# Patient Record
Sex: FEMALE | Race: WHITE | Employment: FULL TIME | URBAN - METROPOLITAN AREA
[De-identification: names, ages, dates, MRNs, and addresses within clinical notes are randomized per-mention and may not be internally consistent; named-entity substitution may affect disease eponyms.]

---

## 2017-01-24 ENCOUNTER — OFFICE VISIT (OUTPATIENT)
Dept: DERMATOLOGY | Age: 37
End: 2017-01-24

## 2017-01-24 DIAGNOSIS — L63.9 ALOPECIA AREATA: ICD-10-CM

## 2017-01-24 DIAGNOSIS — L63.9 ALOPECIA AREATA: Primary | ICD-10-CM

## 2017-01-24 LAB
T4 FREE: 1 NG/DL (ref 0.9–1.8)
TSH REFLEX FT4: 7.38 UIU/ML (ref 0.27–4.2)

## 2017-01-24 PROCEDURE — 11900 INJECT SKIN LESIONS </W 7: CPT | Performed by: DERMATOLOGY

## 2017-01-24 RX ORDER — SELENIUM SULFIDE 2.5 MG/100ML
LOTION TOPICAL DAILY PRN
COMMUNITY
End: 2018-02-20

## 2017-01-26 ENCOUNTER — TELEPHONE (OUTPATIENT)
Dept: DERMATOLOGY | Age: 37
End: 2017-01-26

## 2017-02-08 ENCOUNTER — TELEPHONE (OUTPATIENT)
Dept: DERMATOLOGY | Age: 37
End: 2017-02-08

## 2017-02-15 ENCOUNTER — OFFICE VISIT (OUTPATIENT)
Dept: DERMATOLOGY | Age: 37
End: 2017-02-15

## 2017-02-15 DIAGNOSIS — L63.9 ALOPECIA AREATA: Primary | ICD-10-CM

## 2017-02-15 PROCEDURE — 11900 INJECT SKIN LESIONS </W 7: CPT | Performed by: DERMATOLOGY

## 2017-04-05 ENCOUNTER — OFFICE VISIT (OUTPATIENT)
Dept: ENDOCRINOLOGY | Age: 37
End: 2017-04-05

## 2017-04-05 ENCOUNTER — HOSPITAL ENCOUNTER (OUTPATIENT)
Dept: GENERAL RADIOLOGY | Age: 37
Discharge: HOME | End: 2017-04-05
Attending: INTERNAL MEDICINE | Admitting: INTERNAL MEDICINE

## 2017-04-05 VITALS
DIASTOLIC BLOOD PRESSURE: 80 MMHG | OXYGEN SATURATION: 98 % | WEIGHT: 222 LBS | HEART RATE: 66 BPM | RESPIRATION RATE: 12 BRPM | HEIGHT: 66 IN | SYSTOLIC BLOOD PRESSURE: 117 MMHG | BODY MASS INDEX: 35.68 KG/M2 | TEMPERATURE: 97.1 F

## 2017-04-05 DIAGNOSIS — E03.8 SUBCLINICAL HYPOTHYROIDISM: Primary | ICD-10-CM

## 2017-04-05 DIAGNOSIS — E03.8 SUBCLINICAL HYPOTHYROIDISM: ICD-10-CM

## 2017-04-05 LAB
ANTI-THYROGLOB ABS: 25 IU/ML
T4 FREE: 1.1 NG/DL (ref 0.9–1.8)
THYROID PEROXIDASE (TPO) ABS: 17 IU/ML
TSH SERPL DL<=0.05 MIU/L-ACNC: 6.9 UIU/ML (ref 0.27–4.2)

## 2017-04-05 PROCEDURE — 99243 OFF/OP CNSLTJ NEW/EST LOW 30: CPT | Performed by: INTERNAL MEDICINE

## 2017-04-06 ENCOUNTER — TELEPHONE (OUTPATIENT)
Dept: ENDOCRINOLOGY | Age: 37
End: 2017-04-06

## 2017-04-06 RX ORDER — LEVOTHYROXINE SODIUM 0.03 MG/1
25 TABLET ORAL DAILY
Qty: 30 TABLET | Refills: 3 | Status: SHIPPED | OUTPATIENT
Start: 2017-04-06 | End: 2018-02-20

## 2017-04-19 ENCOUNTER — OFFICE VISIT (OUTPATIENT)
Dept: DERMATOLOGY | Age: 37
End: 2017-04-19

## 2017-04-19 DIAGNOSIS — L63.9 ALOPECIA AREATA: Primary | ICD-10-CM

## 2017-04-19 PROCEDURE — 11900 INJECT SKIN LESIONS </W 7: CPT | Performed by: DERMATOLOGY

## 2017-05-02 ENCOUNTER — OFFICE VISIT (OUTPATIENT)
Dept: FAMILY MEDICINE CLINIC | Age: 37
End: 2017-05-02

## 2017-05-02 VITALS
RESPIRATION RATE: 12 BRPM | HEART RATE: 71 BPM | SYSTOLIC BLOOD PRESSURE: 96 MMHG | TEMPERATURE: 98 F | DIASTOLIC BLOOD PRESSURE: 69 MMHG | WEIGHT: 224.4 LBS | OXYGEN SATURATION: 98 % | BODY MASS INDEX: 37.39 KG/M2 | HEIGHT: 65 IN

## 2017-05-02 DIAGNOSIS — Z00.00 ROUTINE GENERAL MEDICAL EXAMINATION AT A HEALTH CARE FACILITY: Primary | ICD-10-CM

## 2017-05-02 DIAGNOSIS — E66.9 OBESITY (BMI 35.0-39.9 WITHOUT COMORBIDITY): ICD-10-CM

## 2017-05-02 DIAGNOSIS — Z13.220 LIPID SCREENING: ICD-10-CM

## 2017-05-02 DIAGNOSIS — Z01.419 WOMEN'S ANNUAL ROUTINE GYNECOLOGICAL EXAMINATION: ICD-10-CM

## 2017-05-02 PROCEDURE — 99395 PREV VISIT EST AGE 18-39: CPT | Performed by: FAMILY MEDICINE

## 2017-05-02 RX ORDER — COPPER 313.4 MG/1
1 INTRAUTERINE DEVICE INTRAUTERINE ONCE
COMMUNITY
End: 2018-02-20

## 2017-05-15 ENCOUNTER — TELEPHONE (OUTPATIENT)
Dept: FAMILY MEDICINE CLINIC | Age: 37
End: 2017-05-15

## 2017-06-21 ENCOUNTER — OFFICE VISIT (OUTPATIENT)
Dept: DERMATOLOGY | Age: 37
End: 2017-06-21

## 2017-06-21 DIAGNOSIS — L73.9 FOLLICULITIS: ICD-10-CM
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
## 2017-06-21 DIAGNOSIS — S30.861A ARTHROPOD BITE OF ABDOMINAL WALL, INITIAL ENCOUNTER: ICD-10-CM

## 2017-06-21 DIAGNOSIS — L63.9 ALOPECIA AREATA: Primary | ICD-10-CM

## 2017-06-21 PROCEDURE — 11900 INJECT SKIN LESIONS </W 7: CPT | Performed by: DERMATOLOGY

## 2017-06-21 PROCEDURE — 99214 OFFICE O/P EST MOD 30 MIN: CPT | Performed by: DERMATOLOGY

## 2017-06-21 RX ORDER — FLUOCINONIDE 0.5 MG/G
OINTMENT TOPICAL
Qty: 30 G | Refills: 0 | Status: SHIPPED | OUTPATIENT
Start: 2017-06-21 | End: 2018-02-20

## 2017-06-21 RX ORDER — CLINDAMYCIN PHOSPHATE 11.9 MG/ML
SOLUTION TOPICAL
Qty: 60 ML | Refills: 3 | Status: SHIPPED | OUTPATIENT
Start: 2017-06-21 | End: 2018-02-20

## 2017-09-27 ENCOUNTER — OFFICE VISIT (OUTPATIENT)
Dept: DERMATOLOGY | Age: 37
End: 2017-09-27

## 2017-09-27 DIAGNOSIS — L63.9 ALOPECIA AREATA: Primary | ICD-10-CM

## 2017-09-27 PROCEDURE — 11900 INJECT SKIN LESIONS </W 7: CPT | Performed by: DERMATOLOGY

## 2017-10-13 ENCOUNTER — OFFICE VISIT (OUTPATIENT)
Dept: GYNECOLOGY | Age: 37
End: 2017-10-13

## 2017-10-13 VITALS
DIASTOLIC BLOOD PRESSURE: 75 MMHG | TEMPERATURE: 97.2 F | BODY MASS INDEX: 36.29 KG/M2 | RESPIRATION RATE: 17 BRPM | WEIGHT: 225.8 LBS | HEIGHT: 66 IN | SYSTOLIC BLOOD PRESSURE: 111 MMHG | HEART RATE: 68 BPM

## 2017-10-13 DIAGNOSIS — Z01.419 WELL WOMAN EXAM WITH ROUTINE GYNECOLOGICAL EXAM: Primary | ICD-10-CM

## 2017-10-13 DIAGNOSIS — B35.4 TINEA CORPORIS: ICD-10-CM

## 2017-10-13 PROCEDURE — 99385 PREV VISIT NEW AGE 18-39: CPT | Performed by: OBSTETRICS & GYNECOLOGY

## 2017-10-13 RX ORDER — FLUCONAZOLE 100 MG/1
100 TABLET ORAL DAILY
Qty: 7 TABLET | Refills: 1 | Status: SHIPPED | OUTPATIENT
Start: 2017-10-13 | End: 2017-10-20

## 2017-10-17 LAB
HPV COMMENT: NORMAL
HPV TYPE 16: NOT DETECTED
HPV TYPE 18: NOT DETECTED
HPVOH (OTHER TYPES): NOT DETECTED

## 2017-10-17 ASSESSMENT — ENCOUNTER SYMPTOMS
RESPIRATORY NEGATIVE: 1
EYES NEGATIVE: 1
GASTROINTESTINAL NEGATIVE: 1

## 2017-10-18 NOTE — PROGRESS NOTES
for the 10/13/17 encounter (Office Visit) with Tamera Regalado MD   Medication Sig Dispense Refill    fluconazole (DIFLUCAN) 100 MG tablet Take 1 tablet by mouth daily for 7 days 7 tablet 1    clindamycin (CLEOCIN-T) 1 % external solution Apply to new lesions on scalp bid. 60 mL 3    fluocinonide (LIDEX) 0.05 % ointment Apply sparingly to affected area(s) bid prn for flares, up to 2 weeks at a time. Do not apply on cleared skin. 30 g 0    PARAGARD INTRAUTERINE COPPER IUD 1 each by Intrauterine route once 4/2010      selenium sulfide (SELSUN) 2.5 % lotion Apply topically daily as needed for Itching Apply topically daily as needed. Family History   Problem Relation Age of Onset    Lung Cancer Paternal Grandmother     Cancer Paternal Grandmother      melanoma, lung    Heart Disease Maternal Grandfather     Heart Disease Maternal Grandmother     Diabetes Maternal Grandmother     Cancer Maternal Grandmother      skin, NMSC    Heart Disease Paternal Grandfather     Hypertension Mother     Cancer Mother      skin,NMSC    Hypertension Father     Breast Cancer Paternal Aunt     Breast Cancer Maternal Aunt      /75 (Site: Right Arm, Position: Sitting, Cuff Size: Large Adult)   Pulse 68   Temp 97.2 °F (36.2 °C) (Oral)   Resp 17   Ht 5' 6\" (1.676 m)   Wt 225 lb 12.8 oz (102.4 kg)   LMP 10/07/2017 (Exact Date)   Breastfeeding? No   BMI 36.45 kg/m²       Objective:   Physical Exam   Constitutional: She is oriented to person, place, and time. She appears well-developed and well-nourished. No distress. HENT:   Head: Normocephalic and atraumatic. Eyes: EOM are normal. Pupils are equal, round, and reactive to light. Neck: Normal range of motion. Neck supple. No thyromegaly present. Cardiovascular: Normal rate, regular rhythm and normal heart sounds. Exam reveals no gallop and no friction rub. No murmur heard.   Pulmonary/Chest: Effort normal and breath sounds normal. No respiratory distress. She has no wheezes. She has no rales. Abdominal: Soft. She exhibits no distension and no mass. There is no hepatomegaly. There is no tenderness. There is no rebound and no guarding. No hernia. Genitourinary: Vagina normal and uterus normal. Rectal exam shows no external hemorrhoid and no fissure. No breast swelling, tenderness, discharge or bleeding. Pelvic exam was performed with patient supine. No labial fusion. There is no rash, tenderness, lesion or injury on the right labia. There is no rash, tenderness, lesion or injury on the left labia. Uterus is not deviated, not enlarged, not fixed and not tender. Cervix exhibits no motion tenderness, no discharge and no friability. Right adnexum displays no mass, no tenderness and no fullness. Left adnexum displays no mass, no tenderness and no fullness. No erythema, tenderness or bleeding in the vagina. No foreign body in the vagina. No signs of injury around the vagina. No vaginal discharge found. Genitourinary Comments: Normal urethral meatus, nl urethra, nl bladder. Musculoskeletal: Normal range of motion. Lymphadenopathy:        Right: No inguinal adenopathy present. Left: No inguinal adenopathy present. Neurological: She is alert and oriented to person, place, and time. She has normal reflexes. Skin: Skin is warm and dry. Rash noted. Rash along abdomen   Psychiatric: She has a normal mood and affect. Her behavior is normal. Judgment and thought content normal.       Assessment:      1. Annual  2. Tinea corporis      Plan:      1. Pap, calcium, exercise  2.  Try diflucan and see if helps-can repeat if needed

## 2018-03-01 ENCOUNTER — OFFICE VISIT (OUTPATIENT)
Dept: ORTHOPEDIC SURGERY | Age: 38
End: 2018-03-01

## 2018-03-01 ENCOUNTER — OFFICE VISIT (OUTPATIENT)
Dept: DERMATOLOGY | Age: 38
End: 2018-03-01

## 2018-03-01 VITALS — BODY MASS INDEX: 28.91 KG/M2 | WEIGHT: 179.9 LBS | HEIGHT: 66 IN

## 2018-03-01 DIAGNOSIS — L63.9 ALOPECIA AREATA: Primary | ICD-10-CM

## 2018-03-01 DIAGNOSIS — M77.42 METATARSALGIA OF LEFT FOOT: Primary | ICD-10-CM

## 2018-03-01 DIAGNOSIS — M21.42 PES PLANUS - ACQUIRED, LEFT: ICD-10-CM

## 2018-03-01 PROCEDURE — L3040 FT ARCH SUPRT PREMOLD LONGIT: HCPCS | Performed by: ORTHOPAEDIC SURGERY

## 2018-03-01 PROCEDURE — G8427 DOCREV CUR MEDS BY ELIG CLIN: HCPCS | Performed by: ORTHOPAEDIC SURGERY

## 2018-03-01 PROCEDURE — 11900 INJECT SKIN LESIONS </W 7: CPT | Performed by: DERMATOLOGY

## 2018-03-01 PROCEDURE — G8484 FLU IMMUNIZE NO ADMIN: HCPCS | Performed by: ORTHOPAEDIC SURGERY

## 2018-03-01 PROCEDURE — 99203 OFFICE O/P NEW LOW 30 MIN: CPT | Performed by: ORTHOPAEDIC SURGERY

## 2018-03-01 PROCEDURE — 1036F TOBACCO NON-USER: CPT | Performed by: ORTHOPAEDIC SURGERY

## 2018-03-01 PROCEDURE — G8417 CALC BMI ABV UP PARAM F/U: HCPCS | Performed by: ORTHOPAEDIC SURGERY

## 2018-03-01 RX ORDER — MELOXICAM 15 MG/1
15 TABLET ORAL DAILY
Qty: 30 TABLET | Refills: 3 | Status: SHIPPED | OUTPATIENT
Start: 2018-03-01 | End: 2018-04-18

## 2018-03-01 RX ORDER — METHYLPREDNISOLONE 4 MG/1
TABLET ORAL
Qty: 1 KIT | Refills: 0 | Status: SHIPPED | OUTPATIENT
Start: 2018-03-01 | End: 2018-04-18 | Stop reason: ALTCHOICE

## 2018-03-01 NOTE — PROGRESS NOTES
extremity does not show any tenderness, deformity or injury. Range of motion is unremarkable. There is no gross instability. There are no rashes, ulcerations or lesions. Strength and tone are normal.    Radiology:     X-rays reviewed in office: 3 views from the emergency room were obtained and reviewed, there is no evidence of fracture or dislocation. Assessment :  Left foot pes planus and metatarsalgia    Impression:  Encounter Diagnoses   Name Primary?  Metatarsalgia of left foot Yes    Pes planus - acquired, left        Office Procedures:  Orders Placed This Encounter   Procedures    [facility] - [facility]     Referral Priority:   Routine     Referral Type:   Eval and Treat     Referral Reason:   Specialty Services Required     Requested Specialty:   Physical Therapy     Number of Visits Requested:   1    Powerstep Protech Full Length Insert     Patient was prescribed Powerstep Protech Full Length Inserts. The bilateral feet will require stabilization / support from this semi-rigid / rigid orthosis to improve their function. The orthosis will assist in protecting the affected area, provide functional support and facilitate healing. The patient was educated and fit by a healthcare professional with expert knowledge and specialization in brace application while under the direct supervision of the treating physician. Verbal and written instructions for the use of and application of this item were provided. They were instructed to contact the office immediately should the brace result in increased pain, decreased sensation, increased swelling or worsening of the condition. Treatment Plan:  We discussed the etiology of Pes planus and metatarsalgia as well as its operative and nonoperative treatments.   Nonoperative treatment includes activity modification, immobilization with cast or boot, support like shoes and inserts, medicines by mouth as appropriate, physical therapy,

## 2018-03-01 NOTE — PROGRESS NOTES
[facility]) Dermatology  Cain Jackashliemaikel      [place] Vanna  1980    40 y.o. female     Date of Visit: 3/1/2018    Last Visit: 5mo    Chief Complaint: AA    History of Present Illness:  1. Follow-up for AA of scalp. S/p ILK 5mg/ml x 5. Reports continued hair growth in all but occipital scalp lesions. Last month, noticed a new lesion on L side. -12/2016 - CBC wnl  -TSH 5.13, FT4 wnl -- started on synthroid    Derm History:   -TV - selenium sulfide lotion   -Scalp folliculitis - clindamycin soln  -Arthropod bites - lidex oint     Review of Systems:  Constitutional: Reports general sense of well-being. Allergies: Reviewed and updated. Past Medical History, Surgical History, Medications and Allergies reviewed. Past Medical History:   Diagnosis Date    Obesity (BMI 35.0-39.9 without comorbidity) 5/2/2017    Subclinical hypothyroidism 4/5/2017     History reviewed. No pertinent surgical history. Allergies   Allergen Reactions    Honey Bee Venom [Bee Venom] Anaphylaxis    Nutritional Supplements Shortness Of Breath    Benadryl [Diphenhydramine Hcl] Hives    Demerol Hives    Paula Nausea Only    Amoxicillin Nausea And Vomiting    Codeine Nausea And Vomiting    Erythromycin Nausea And Vomiting and Rash    Morphine Nausea And Vomiting    Pcn [Penicillins] Nausea And Vomiting    Percocet [Oxycodone-Acetaminophen] Nausea And Vomiting     No outpatient prescriptions have been marked as taking for the 3/1/18 encounter (Office Visit) with Yamile Shrestha MD.       Physical Examination     The following were examined and determined to be normal: Psych/Neuro. The following were examined and determined to be abnormal: Scalp/hair    -General: Well-appearing, NAD  1. L parietal scalp 3cm, midline occipital scalp 6cm (increased from 4cm at last visit) - mildly alopecic skin-colored patches w/ several short intermediate hairs    Assessment and Plan     1.  Alopecia areata - marked improvement but continued lesions   -IL kenalog 5 mg/ml; total 0.8ml to 2 lesion(s).  Edu re: atrophy, dyspigmentation.   -Repeat in 1 month  -Edu re: natural course of AA, potential need for repeat ILK at next several visits

## 2018-04-11 ENCOUNTER — OFFICE VISIT (OUTPATIENT)
Dept: DERMATOLOGY | Age: 38
End: 2018-04-11

## 2018-04-11 DIAGNOSIS — L63.9 ALOPECIA AREATA: Primary | ICD-10-CM

## 2018-04-11 PROCEDURE — 11900 INJECT SKIN LESIONS </W 7: CPT | Performed by: DERMATOLOGY

## 2018-04-11 RX ORDER — LEVOTHYROXINE SODIUM 0.03 MG/1
25 TABLET ORAL DAILY
COMMUNITY
End: 2018-05-10

## 2018-04-18 ENCOUNTER — OFFICE VISIT (OUTPATIENT)
Dept: GYNECOLOGY | Age: 38
End: 2018-04-18

## 2018-04-18 VITALS
SYSTOLIC BLOOD PRESSURE: 118 MMHG | RESPIRATION RATE: 17 BRPM | HEIGHT: 66 IN | DIASTOLIC BLOOD PRESSURE: 72 MMHG | BODY MASS INDEX: 36.48 KG/M2 | WEIGHT: 227 LBS | HEART RATE: 80 BPM

## 2018-04-18 DIAGNOSIS — B37.2 YEAST INFECTION OF THE SKIN: ICD-10-CM

## 2018-04-18 DIAGNOSIS — E03.8 OTHER SPECIFIED HYPOTHYROIDISM: Primary | ICD-10-CM

## 2018-04-18 LAB
ALBUMIN SERPL-MCNC: 4.5 G/DL (ref 3.4–5)
ALP BLD-CCNC: 72 U/L (ref 40–129)
ALT SERPL-CCNC: 15 U/L (ref 10–40)
ANION GAP SERPL CALCULATED.3IONS-SCNC: 14 MMOL/L (ref 3–16)
AST SERPL-CCNC: 16 U/L (ref 15–37)
BILIRUB SERPL-MCNC: 0.3 MG/DL (ref 0–1)
BILIRUBIN DIRECT: <0.2 MG/DL (ref 0–0.3)
BILIRUBIN, INDIRECT: NORMAL MG/DL (ref 0–1)
BUN BLDV-MCNC: 16 MG/DL (ref 7–20)
CALCIUM SERPL-MCNC: 9.5 MG/DL (ref 8.3–10.6)
CHLORIDE BLD-SCNC: 100 MMOL/L (ref 99–110)
CO2: 26 MMOL/L (ref 21–32)
CREAT SERPL-MCNC: 0.7 MG/DL (ref 0.6–1.1)
GFR AFRICAN AMERICAN: >60
GFR NON-AFRICAN AMERICAN: >60
GLUCOSE BLD-MCNC: 99 MG/DL (ref 70–99)
PHOSPHORUS: 4.3 MG/DL (ref 2.5–4.9)
POTASSIUM SERPL-SCNC: 4.6 MMOL/L (ref 3.5–5.1)
SODIUM BLD-SCNC: 140 MMOL/L (ref 136–145)
T3 FREE: 3.4 PG/ML (ref 2.3–4.2)
T4 FREE: 1.1 NG/DL (ref 0.9–1.8)
TOTAL PROTEIN: 6.9 G/DL (ref 6.4–8.2)
TSH SERPL DL<=0.05 MIU/L-ACNC: 4.77 UIU/ML (ref 0.27–4.2)

## 2018-04-18 PROCEDURE — 1036F TOBACCO NON-USER: CPT | Performed by: OBSTETRICS & GYNECOLOGY

## 2018-04-18 PROCEDURE — G8427 DOCREV CUR MEDS BY ELIG CLIN: HCPCS | Performed by: OBSTETRICS & GYNECOLOGY

## 2018-04-18 PROCEDURE — G8417 CALC BMI ABV UP PARAM F/U: HCPCS | Performed by: OBSTETRICS & GYNECOLOGY

## 2018-04-18 PROCEDURE — 99213 OFFICE O/P EST LOW 20 MIN: CPT | Performed by: OBSTETRICS & GYNECOLOGY

## 2018-04-18 RX ORDER — FLUCONAZOLE 100 MG/1
100 TABLET ORAL DAILY
Qty: 21 TABLET | Refills: 0 | Status: SHIPPED | OUTPATIENT
Start: 2018-04-18 | End: 2021-04-07 | Stop reason: SDUPTHER

## 2018-05-10 ENCOUNTER — OFFICE VISIT (OUTPATIENT)
Dept: ENDOCRINOLOGY | Age: 38
End: 2018-05-10

## 2018-05-10 VITALS
BODY MASS INDEX: 36.71 KG/M2 | HEART RATE: 71 BPM | SYSTOLIC BLOOD PRESSURE: 113 MMHG | OXYGEN SATURATION: 99 % | HEIGHT: 66 IN | WEIGHT: 228.4 LBS | RESPIRATION RATE: 14 BRPM | DIASTOLIC BLOOD PRESSURE: 81 MMHG

## 2018-05-10 DIAGNOSIS — E03.8 SUBCLINICAL HYPOTHYROIDISM: Primary | ICD-10-CM

## 2018-05-10 PROCEDURE — G8427 DOCREV CUR MEDS BY ELIG CLIN: HCPCS | Performed by: INTERNAL MEDICINE

## 2018-05-10 PROCEDURE — G8417 CALC BMI ABV UP PARAM F/U: HCPCS | Performed by: INTERNAL MEDICINE

## 2018-05-10 PROCEDURE — 99214 OFFICE O/P EST MOD 30 MIN: CPT | Performed by: INTERNAL MEDICINE

## 2018-05-10 PROCEDURE — 1036F TOBACCO NON-USER: CPT | Performed by: INTERNAL MEDICINE

## 2018-05-10 RX ORDER — LEVOTHYROXINE SODIUM 25 MCG
25 TABLET ORAL DAILY
Qty: 30 TABLET | Refills: 3 | Status: SHIPPED | OUTPATIENT
Start: 2018-05-10 | End: 2021-04-07 | Stop reason: ALTCHOICE

## 2018-05-10 ASSESSMENT — ENCOUNTER SYMPTOMS
BLURRED VISION: 0
COUGH: 0
HEMOPTYSIS: 0
DOUBLE VISION: 0
BACK PAIN: 0
PHOTOPHOBIA: 0
ORTHOPNEA: 0

## 2018-05-23 ENCOUNTER — OFFICE VISIT (OUTPATIENT)
Dept: DERMATOLOGY | Age: 38
End: 2018-05-23

## 2018-05-23 DIAGNOSIS — L63.9 ALOPECIA AREATA: Primary | ICD-10-CM

## 2018-05-23 PROCEDURE — 11900 INJECT SKIN LESIONS </W 7: CPT | Performed by: DERMATOLOGY

## 2018-08-05 PROBLEM — M21.40 ACQUIRED PES PLANUS: Status: ACTIVE | Noted: 2018-03-01

## 2018-10-23 ENCOUNTER — OFFICE VISIT (OUTPATIENT)
Dept: DERMATOLOGY | Age: 38
End: 2018-10-23
Payer: MEDICARE

## 2018-10-23 DIAGNOSIS — L63.9 ALOPECIA AREATA: Primary | ICD-10-CM

## 2018-10-23 DIAGNOSIS — B00.9 HERPES SIMPLEX: ICD-10-CM

## 2018-10-23 PROCEDURE — 99214 OFFICE O/P EST MOD 30 MIN: CPT | Performed by: DERMATOLOGY

## 2018-10-23 PROCEDURE — 11900 INJECT SKIN LESIONS </W 7: CPT | Performed by: DERMATOLOGY

## 2018-10-23 RX ORDER — ACYCLOVIR 50 MG/G
OINTMENT TOPICAL
Qty: 30 G | Refills: 1 | Status: SHIPPED | OUTPATIENT
Start: 2018-10-23 | End: 2021-04-07 | Stop reason: SDUPTHER

## 2018-10-23 NOTE — PROGRESS NOTES
[facility]) Dermatology  Sage Saravia      Layton Powersven  1980    41 y.o. female     Date of Visit: 10/23/2018    Last Visit: 5mo    Chief Complaint: AA    History of Present Illness:  1. Follow-up for AA of scalp. S/p ILK 5mg/ml. Reports continued hair growth in 3 treated lesions. No new lesions. -12/2016 - CBC wnl  -TSH 5.13, FT4 wnl -- started on synthroid    2. New issue. Complains of a mildly tender recurrent blistering eruption of R chin. Flares every few months, typically around menses. Recurs in the same location. Derm History:   -TV - selenium sulfide lotion   -Scalp folliculitis - clindamycin soln  -Arthropod bites - lidex oint     Review of Systems:  Constitutional: Reports general sense of well-being. Allergies: Reviewed and updated. Past Medical History, Surgical History, Medications and Allergies reviewed. Past Medical History:   Diagnosis Date    Obesity (BMI 35.0-39.9 without comorbidity) 5/2/2017    Subclinical hypothyroidism 4/5/2017     History reviewed. No pertinent surgical history. Allergies   Allergen Reactions    Honey Bee Venom [Bee Venom] Anaphylaxis    Nutritional Supplements Shortness Of Breath    Benadryl [Diphenhydramine Hcl] Hives    Demerol Hives    Paula Nausea Only    Amoxicillin Nausea And Vomiting    Codeine Nausea And Vomiting    Erythromycin Nausea And Vomiting and Rash    Morphine Nausea And Vomiting    Pcn [Penicillins] Nausea And Vomiting    Percocet [Oxycodone-Acetaminophen] Nausea And Vomiting     Outpatient Prescriptions Marked as Taking for the 10/23/18 encounter (Office Visit) with Shakira Tay MD   Medication Sig Dispense Refill    SYNTHROID 25 MCG tablet Take 1 tablet by mouth Daily 30 tablet 3       Physical Examination     The following were examined and determined to be normal: Psych/Neuro. The following were examined and determined to be abnormal: Scalp/hair, Head/face    -General: Well-appearing, NAD  1.  L

## 2021-04-07 ENCOUNTER — OFFICE VISIT (OUTPATIENT)
Dept: FAMILY MEDICINE CLINIC | Age: 41
End: 2021-04-07
Payer: COMMERCIAL

## 2021-04-07 VITALS
TEMPERATURE: 97.3 F | HEIGHT: 66 IN | HEART RATE: 85 BPM | OXYGEN SATURATION: 98 % | BODY MASS INDEX: 41.24 KG/M2 | SYSTOLIC BLOOD PRESSURE: 127 MMHG | RESPIRATION RATE: 16 BRPM | DIASTOLIC BLOOD PRESSURE: 82 MMHG | WEIGHT: 256.6 LBS

## 2021-04-07 DIAGNOSIS — R06.02 SHORTNESS OF BREATH: ICD-10-CM

## 2021-04-07 DIAGNOSIS — B37.2 YEAST INFECTION OF THE SKIN: ICD-10-CM

## 2021-04-07 DIAGNOSIS — Z00.00 ROUTINE GENERAL MEDICAL EXAMINATION AT A HEALTH CARE FACILITY: ICD-10-CM

## 2021-04-07 DIAGNOSIS — Z00.00 ROUTINE GENERAL MEDICAL EXAMINATION AT A HEALTH CARE FACILITY: Primary | ICD-10-CM

## 2021-04-07 DIAGNOSIS — Z91.030 BEE STING ALLERGY: ICD-10-CM

## 2021-04-07 LAB
A/G RATIO: 1.8 (ref 1.1–2.2)
ALBUMIN SERPL-MCNC: 4.2 G/DL (ref 3.4–5)
ALP BLD-CCNC: 64 U/L (ref 40–129)
ALT SERPL-CCNC: 14 U/L (ref 10–40)
ANION GAP SERPL CALCULATED.3IONS-SCNC: 10 MMOL/L (ref 3–16)
AST SERPL-CCNC: 16 U/L (ref 15–37)
BILIRUB SERPL-MCNC: <0.2 MG/DL (ref 0–1)
BUN BLDV-MCNC: 14 MG/DL (ref 7–20)
CALCIUM SERPL-MCNC: 8.9 MG/DL (ref 8.3–10.6)
CHLORIDE BLD-SCNC: 105 MMOL/L (ref 99–110)
CHOLESTEROL, TOTAL: 176 MG/DL (ref 0–199)
CO2: 24 MMOL/L (ref 21–32)
CREAT SERPL-MCNC: 0.7 MG/DL (ref 0.6–1.1)
D DIMER: <200 NG/ML DDU (ref 0–229)
GFR AFRICAN AMERICAN: >60
GFR NON-AFRICAN AMERICAN: >60
GLOBULIN: 2.3 G/DL
GLUCOSE BLD-MCNC: 87 MG/DL (ref 70–99)
HCT VFR BLD CALC: 40.1 % (ref 36–48)
HDLC SERPL-MCNC: 55 MG/DL (ref 40–60)
HEMOGLOBIN: 13.3 G/DL (ref 12–16)
LDL CHOLESTEROL CALCULATED: 74 MG/DL
MCH RBC QN AUTO: 28.6 PG (ref 26–34)
MCHC RBC AUTO-ENTMCNC: 33.3 G/DL (ref 31–36)
MCV RBC AUTO: 86.1 FL (ref 80–100)
PDW BLD-RTO: 15.3 % (ref 12.4–15.4)
PLATELET # BLD: 245 K/UL (ref 135–450)
PMV BLD AUTO: 8.3 FL (ref 5–10.5)
POTASSIUM SERPL-SCNC: 4.4 MMOL/L (ref 3.5–5.1)
RBC # BLD: 4.65 M/UL (ref 4–5.2)
SODIUM BLD-SCNC: 139 MMOL/L (ref 136–145)
T4 FREE: 1 NG/DL (ref 0.9–1.8)
TOTAL PROTEIN: 6.5 G/DL (ref 6.4–8.2)
TRIGL SERPL-MCNC: 237 MG/DL (ref 0–150)
TSH SERPL DL<=0.05 MIU/L-ACNC: 5.35 UIU/ML (ref 0.27–4.2)
VLDLC SERPL CALC-MCNC: 47 MG/DL
WBC # BLD: 7.8 K/UL (ref 4–11)

## 2021-04-07 PROCEDURE — 99385 PREV VISIT NEW AGE 18-39: CPT | Performed by: FAMILY MEDICINE

## 2021-04-07 RX ORDER — ACYCLOVIR 50 MG/G
OINTMENT TOPICAL
Qty: 30 G | Refills: 1 | Status: SHIPPED | OUTPATIENT
Start: 2021-04-07

## 2021-04-07 RX ORDER — EPINEPHRINE 0.3 MG/.3ML
INJECTION SUBCUTANEOUS
Qty: 2 EACH | Refills: 1 | Status: SHIPPED | OUTPATIENT
Start: 2021-04-07 | End: 2022-08-16

## 2021-04-07 RX ORDER — FLUCONAZOLE 100 MG/1
100 TABLET ORAL DAILY
Qty: 21 TABLET | Refills: 0 | Status: SHIPPED | OUTPATIENT
Start: 2021-04-07 | End: 2021-04-14

## 2021-04-07 SDOH — ECONOMIC STABILITY: INCOME INSECURITY: HOW HARD IS IT FOR YOU TO PAY FOR THE VERY BASICS LIKE FOOD, HOUSING, MEDICAL CARE, AND HEATING?: NOT HARD AT ALL

## 2021-04-07 SDOH — ECONOMIC STABILITY: TRANSPORTATION INSECURITY
IN THE PAST 12 MONTHS, HAS LACK OF TRANSPORTATION KEPT YOU FROM MEETINGS, WORK, OR FROM GETTING THINGS NEEDED FOR DAILY LIVING?: NO

## 2021-04-07 SDOH — ECONOMIC STABILITY: FOOD INSECURITY: WITHIN THE PAST 12 MONTHS, THE FOOD YOU BOUGHT JUST DIDN'T LAST AND YOU DIDN'T HAVE MONEY TO GET MORE.: NEVER TRUE

## 2021-04-07 SDOH — ECONOMIC STABILITY: FOOD INSECURITY: WITHIN THE PAST 12 MONTHS, YOU WORRIED THAT YOUR FOOD WOULD RUN OUT BEFORE YOU GOT MONEY TO BUY MORE.: NEVER TRUE

## 2021-04-07 ASSESSMENT — PATIENT HEALTH QUESTIONNAIRE - PHQ9: SUM OF ALL RESPONSES TO PHQ QUESTIONS 1-9: 0

## 2021-04-07 NOTE — PATIENT INSTRUCTIONS
Plan to complete your lab work. You can get it done at any [facility] locations such as the [facility] [address] [place] [address]. [facility]. There are certain medications for weight loss that your primary care doctor prescribes. Take your time to review those medications below. Call or send a [employer] message if you are ready to start 1 of those medications. Keep your follow-up with your OB/GYN. Go to the pharmacy to  medications. Your primary care doctor is asking that they dispense the affordable inexpensive epinephrine pen. Make sure you are familiar how to use the pen along with any family or friends that you typically enjoy the outdoors with. There are FDA approved medications and non-FDA approved medications Dr. Edith Cruz prescribes for weight loss. There is a medication called Saxenda. It is a once a day injection to the abdomen (small needle, it might be or might not be expensive). The other FDA approved med I prescribes is called Contrave which is a twice a day pill. Other medications that can help with weight loss (but not FDA approved for weight loss) include twice a day metformin (inexpensive, not as much weight loss as the FDA approved medications), or twice a day topiramate (inexpenstive, not as much weight loss as the FDA approved medications).

## 2021-04-07 NOTE — PROGRESS NOTES
[facility]  Progress Note  DO Trixie Esparza  1980 04/07/21    Chief Complaint:   Trixie Welch is a 45 y.o. female who is here for yearly checkup and prescription refill for cold sore        HPI:   Doing fine otherwise. Last visit here was in 2017. With some life changes and loss of health insurance patient was not able to follow-up either here or with her OB/GYN. She is due to get the IUD replaced by her [address]. She otherwise is doing fine and requests refill of antiviral which helps with cold sores and fluconazole tablets. [facility] she would like to [facility]ate the prescriptions. Due for lab work. Had thyroid levels that were borderline at 1 point and on for 25 mcg of levothyroxine. She is busy managing a retail store and also raising 4 children with her  who is a  at a country club. She does inform me of her health goals to lose weight. Finds that when she is leaning forward sometimes she experiences right-sided mid back pain that is situationally related to position and not with exertion. ROS negative for headache, visionchanges, chest pain,  abdominal pain, urinary sx, bowel changes. Past medical, surgical, and social history reviewed. and allergies reviewed. Allergies   Allergen Reactions    Honey Bee Venom [Bee Venom] Anaphylaxis    Nutritional Supplements Shortness Of Breath    Benadryl [Diphenhydramine Hcl] Hives    Demerol Hives    Paula Nausea Only    Amoxicillin Nausea And Vomiting    Codeine Nausea And Vomiting    Erythromycin Nausea And Vomiting and Rash    Morphine Nausea And Vomiting    Pcn [Penicillins] Nausea And Vomiting    Percocet [Oxycodone-Acetaminophen] Nausea And Vomiting     Prior to Visit Medications    Medication Sig Taking?  Authorizing Provider   fluconazole (DIFLUCAN) 100 MG tablet Take 1 tablet by mouth daily for 7 days Take from 4/18-4/24, 5/18-5/24, 6/18-6/24 Yes Kwan Bartholomew DO   acyclovir (ZOVIRAX) 5 % ointment Apply topically 5 times daily for flares Yes Kwan Bartholomew DO   EPINEPHrine (EPIPEN) 0.3 MG/0.3ML SOAJ injection Use as directed for allergic reaction Yes Kwan Bartholomew DO          Vitals:    04/07/21 1023   BP: 127/82   Pulse: 85   Resp: 16   Temp: 97.3 °F (36.3 °C)   TempSrc: Tympanic   SpO2: 98%   Weight: 256 lb 9.6 oz (116.4 kg)   Height: 5' 6\" (1.676 m)      Wt Readings from Last 3 Encounters:   04/07/21 256 lb 9.6 oz (116.4 kg)   05/10/18 228 lb 6.4 oz (103.6 kg)   04/18/18 227 lb (103 kg)     BP Readings from Last 3 Encounters:   04/07/21 127/82   05/10/18 113/81   04/18/18 118/72       Patient Active Problem List   Diagnosis    Alopecia areata    Subclinical hypothyroidism    Obesity (BMI 35.0-39.9 without comorbidity)    Acquired pes planus    Metatarsalgia of left foot       Immunization History   Administered Date(s) Administered    Influenza A ([id number]),all Formulations 01/11/2010       Past Medical History:   Diagnosis Date    Obesity (BMI 35.0-39.9 without comorbidity) 5/2/2017    Subclinical hypothyroidism 4/5/2017     No past surgical history on file.   Family History   Problem Relation Age of Onset    Lung Cancer Paternal Grandmother     Cancer Paternal Grandmother         melanoma, lung    Heart Disease Maternal Grandfather     Heart Disease Maternal Grandmother     Diabetes Maternal Grandmother     Cancer Maternal Grandmother         skin, NMSC    Heart Disease Paternal Grandfather     Hypertension Mother     Cancer Mother         skin,NMSC    Hypertension Father     Breast Cancer Paternal Aunt     Breast Cancer Maternal Aunt      Social History     Socioeconomic History    Marital status:      Spouse name: Not on file    Number of children: Not on file    Years of education: Not on file    Highest education level: Not on file   Occupational History    Not on file   Social Needs    Financial resource strain: Not hard at all   Socialthing insecurity     Worry: Never true     Inability: Never true    Transportation needs     Medical: No     Non-medical: No   Tobacco Use    Smoking status: Never Smoker    Smokeless tobacco: Never Used   Substance and Sexual Activity    Alcohol use: Yes     Comment: socially    Drug use: No    Sexual activity: Yes     Partners: Male   Lifestyle    Physical activity     Days per week: Not on file     Minutes per session: Not on file    Stress: Not on file   Relationships    Social connections     Talks on phone: Not on file     Gets together: Not on file     Attends Hoah[employer]ism service: Not on file     Active member of club or organization: Not on file     Attends meetings of clubs or organizations: Not on file     Relationship status: Not on file    Intimate partner violence     Fear of current or ex partner: Not on file     Emotionally abused: Not on file     Physically abused: Not on file     Forced sexual activity: Not on file   Other Topics Concern    Not on file   Social History Narrative    Not on file       O: /82   Pulse 85   Temp 97.3 °F (36.3 °C) (Tympanic)   Resp 16   Ht 5' 6\" (1.676 m)   Wt 256 lb 9.6 oz (116.4 kg)   LMP 03/10/2021 (Exact Date)   SpO2 98%   Breastfeeding No   BMI 41.42 kg/m²   Physical Exam  GEN: No acute distress,cooperative, well nourished, alert. HEENT: PEERLA, EOMI , normocephalic/atraumatic, external nose appears normal.  External ear is normal.    Neck: soft, supple, no appreciable thyromegaly,mass  CV: No upper extremity edema. Resp:  Breathing comfortably. Psych:normal affect. Neuro: AOx3  Other Pertinent Physical Exam findings:   Heart: Normal S1 and S2 with regular rhythm. Lungs: Clear to auscultation bilaterally. Abd: No tenderness to palpation. ASSESSMENT   Diagnosis Orders   1.  Routine general medical examination at a health care facility  TSH without Reflex    T4, FREE LIPID PANEL    CBC    COMPREHENSIVE METABOLIC PANEL   2. Yeast infection of the skin  fluconazole (DIFLUCAN) 100 MG tablet   3. Bee sting allergy  EPINEPHrine (EPIPEN) 0.3 MG/0.3ML SOAJ injection   4. Shortness of breath  D-DIMER, QUANTITATIVE       #2-3: prn medicaitons renewed. #4: Since she is going to get lab work soon she would like reassurance that her symptoms are not due to a blood clot and I did tell her about the utility of the D-dimer test.  D-dimer is elevated and will proceed with appropriate testing. PLAN          Time spent on encounter (to include any same day medical record review): 36 minutes  Established E/M: 10-19 (10705), 20-29 (03978), 30-39 (05876), 40-54 (60255)   New E/M: 15-29 (93729), 30-44 (00453), 45-59 (54832), 60-74 (08040)  Telephone E/M: 5-10 (72133), 11-20 (93944), 21-30 (09840)    If applicable, see additional patient information and instructions under \"Patient Instructions. \"    Return in about 1 year (around 4/7/2022) for Wellness/Health Maintenance. Patient Instructions   Plan to complete your lab work. You can get it done at any [address] locations such as the [facility] [address] [address] [facility]. There are certain medications for weight loss that your primary care doctor prescribes. Take your time to review those medications below. Call or send a [employer] message if you are ready to start 1 of those medications. Keep your follow-up with your OB/GYN. Go to the pharmacy to  medications. Your primary care doctor is asking that they dispense the affordable inexpensive epinephrine pen. Make sure you are familiar how to use the pen along with any family or friends that you typically enjoy the outdoors with. There are FDA approved medications and non-FDA approved medications Dr. Halina Bonilla prescribes for weight loss. There is a medication called Saxenda. It is a once a day injection to the abdomen (small needle, it might be or might not be expensive).      The other FDA approved med I prescribes is called Contrave which is a twice a day pill. Other medications that can help with weight loss (but not FDA approved for weight loss) include twice a day metformin (inexpensive, not as much weight loss as the FDA approved medications), or twice a day topiramate (inexpenstive, not as much weight loss as the FDA approved medications). Please note a portion of this chart was generated using dragon dictation software. Although every effort was made to ensure the accuracy of this automated transcription,some errors in transcription may have occurred.

## 2021-04-08 ENCOUNTER — TELEPHONE (OUTPATIENT)
Dept: FAMILY MEDICINE CLINIC | Age: 41
End: 2021-04-08

## 2021-04-08 DIAGNOSIS — E03.8 SUBCLINICAL HYPOTHYROIDISM: Primary | ICD-10-CM

## 2021-04-08 RX ORDER — LEVOTHYROXINE SODIUM 25 MCG
25 TABLET ORAL DAILY
Qty: 30 TABLET | Refills: 5 | Status: SHIPPED | OUTPATIENT
Start: 2021-04-08 | End: 2022-08-16

## 2021-04-08 NOTE — TELEPHONE ENCOUNTER
Thyroid Function test showing borderline findings. Has had diagnosis of subclinical hypothyroidism in the past.  She would like to resume her levothyroxine and it seems that brand-name Synthroid was preferred. I sent the Synthroid to her [address] in [facility]. Please lab orders in the system ideally to recheck lab work in 8 to 12 weeks. Upon completing lab work it is advisable to follow-up an appointment either in person or by telehealth. One of her main health goals is to lose weight and I think the reintroduction of Synthroid could help her boost her metabolism. She still has the option to get back to me if she wants to take a weight loss pill on top of the thyroid replacement medicine. Please note that all or a portion of this documentation was generated using dragon dictation software.  Although every effort was made to ensure the accuracy of this automated transcription, some errors in transcription may have occurred

## 2021-05-04 ENCOUNTER — OFFICE VISIT (OUTPATIENT)
Dept: GYNECOLOGY | Age: 41
End: 2021-05-04
Payer: COMMERCIAL

## 2021-05-04 VITALS
HEART RATE: 70 BPM | DIASTOLIC BLOOD PRESSURE: 74 MMHG | WEIGHT: 254.8 LBS | SYSTOLIC BLOOD PRESSURE: 110 MMHG | BODY MASS INDEX: 40.95 KG/M2 | HEIGHT: 66 IN | RESPIRATION RATE: 17 BRPM

## 2021-05-04 DIAGNOSIS — Z01.419 WELL WOMAN EXAM WITH ROUTINE GYNECOLOGICAL EXAM: Primary | ICD-10-CM

## 2021-05-04 PROCEDURE — 99385 PREV VISIT NEW AGE 18-39: CPT | Performed by: OBSTETRICS & GYNECOLOGY

## 2021-05-04 RX ORDER — COPPER 313.4 MG/1
1 INTRAUTERINE DEVICE INTRAUTERINE ONCE
COMMUNITY

## 2021-05-04 ASSESSMENT — ENCOUNTER SYMPTOMS
GASTROINTESTINAL NEGATIVE: 1
EYES NEGATIVE: 1
RESPIRATORY NEGATIVE: 1

## 2021-05-04 NOTE — PROGRESS NOTES
Subjective:      Patient ID: Opal Santos is a 45 y.o. female. Patient is here for annual. Patient wants new paragard IUD. Happy with this. Gynecologic Exam        Review of Systems   Constitutional: Negative. HENT: Negative. Eyes: Negative. Respiratory: Negative. Cardiovascular: Negative. Gastrointestinal: Negative. Genitourinary: Negative. Musculoskeletal: Negative. Skin: Negative. Neurological: Negative. Psychiatric/Behavioral: Negative. Date of Birth 1980  Past Medical History:   Diagnosis Date    Obesity (BMI 35.0-39.9 without comorbidity) 2017    Subclinical hypothyroidism 2017     No past surgical history on file.   OB History    Para Term  AB Living   2 2 2     2   SAB TAB Ectopic Molar Multiple Live Births                    # Outcome Date GA Lbr Ren/2nd Weight Sex Delivery Anes PTL Lv   2 Term 01/26/10 40w0d   F Vag-Spont      1 Term 07 40w0d   M Vag-Spont        Social History     Socioeconomic History    Marital status:      Spouse name: Not on file    Number of children: Not on file    Years of education: Not on file    Highest education level: Not on file   Occupational History    Not on file   Social Needs    Financial resource strain: Not hard at all   [place] insecurity     Worry: Never true     Inability: Never true   [employer] needs     Medical: No     Non-medical: No   Tobacco Use    Smoking status: Never Smoker    Smokeless tobacco: Never Used   Substance and Sexual Activity    Alcohol use: Yes     Comment: socially    Drug use: No    Sexual activity: Yes     Partners: Male   Lifestyle    Physical activity     Days per week: Not on file     Minutes per session: Not on file    Stress: Not on file   Relationships    Social connections     Talks on phone: Not on file     Gets together: Not on file     Attends Shinto service: Not on file     Active member of club or organization: Not on file Attends meetings of clubs or organizations: Not on file     Relationship status: Not on file    Intimate partner violence     Fear of current or ex partner: Not on file     Emotionally abused: Not on file     Physically abused: Not on file     Forced sexual activity: Not on file   Other Topics Concern    Not on file   Social History Narrative    Not on file     Allergies   Allergen Reactions    Honey Bee Venom [Bee Venom] Anaphylaxis    Nutritional Supplements Shortness Of Breath    Benadryl [Diphenhydramine Hcl] Hives    Demerol Hives    Paula Nausea Only    Amoxicillin Nausea And Vomiting    Codeine Nausea And Vomiting    Erythromycin Nausea And Vomiting and Rash    Morphine Nausea And Vomiting    Pcn [Penicillins] Nausea And Vomiting    Percocet [Oxycodone-Acetaminophen] Nausea And Vomiting     Outpatient Medications Marked as Taking for the 5/4/21 encounter (Office Visit) with Jennifer Edward MD   Medication Sig Dispense Refill    Paragard Intrauterine Copper IUD 1 each by Intrauterine route once 2010      SYNTHROID 25 MCG tablet Take 1 tablet by mouth Daily 30 tablet 5    acyclovir (ZOVIRAX) 5 % ointment Apply topically 5 times daily for flares 30 g 1     Family History   Problem Relation Age of Onset    Lung Cancer Paternal Grandmother     Cancer Paternal Grandmother         melanoma, lung    Heart Disease Maternal Grandfather     Heart Disease Maternal Grandmother     Diabetes Maternal Grandmother     Cancer Maternal Grandmother         skin, NMSC    Heart Disease Paternal Grandfather     Hypertension Mother     Cancer Mother         skin,NMSC    Hypertension Father     Breast Cancer Paternal Aunt     Breast Cancer Maternal Aunt      /74 (Site: Right Upper Arm, Position: Sitting, Cuff Size: Large Adult)   Pulse 70   Resp 17   Ht 5' 6\" (1.676 m)   Wt 254 lb 12.8 oz (115.6 kg)   LMP 04/14/2021   Breastfeeding No   BMI 41.13 kg/m²       Objective:   Physical Exam  Constitutional:       Appearance: Normal appearance. She is well-developed and normal weight. HENT:      Head: Normocephalic. Nose: Nose normal.      Mouth/Throat:      Mouth: Mucous membranes are moist.      Pharynx: Oropharynx is clear. Eyes:      Pupils: Pupils are equal, round, and reactive to light. Neck:      Musculoskeletal: Normal range of motion and neck supple. No neck rigidity. Thyroid: No thyromegaly. Cardiovascular:      Rate and Rhythm: Normal rate and regular rhythm. Pulses: Normal pulses. Heart sounds: Normal heart sounds. No murmur. No friction rub. No gallop. Pulmonary:      Effort: Pulmonary effort is normal. No respiratory distress. Breath sounds: Normal breath sounds. No stridor. No wheezing, rhonchi or rales. Chest:      Chest wall: No tenderness. Breasts:         Right: Normal. No swelling, bleeding, inverted nipple, mass, nipple discharge, skin change or tenderness. Left: Normal. No swelling, bleeding, inverted nipple, mass, nipple discharge, skin change or tenderness. Abdominal:      General: Bowel sounds are normal. There is no distension. Palpations: Abdomen is soft. There is no mass. Tenderness: There is no abdominal tenderness. There is no guarding or rebound. Hernia: No hernia is present. There is no hernia in the left inguinal area. Genitourinary:     General: Normal vulva. Exam position: Lithotomy position. Pubic Area: No rash. Labia:         Right: No rash, tenderness, lesion or injury. Left: No rash, tenderness, lesion or injury. Urethra: No prolapse, urethral pain, urethral swelling or urethral lesion. Vagina: No signs of injury and foreign body. No vaginal discharge, erythema, tenderness, bleeding, lesions or prolapsed vaginal walls. Cervix: No cervical motion tenderness, discharge, friability, lesion, erythema, cervical bleeding or eversion.       Uterus: Not deviated, not enlarged, not fixed and not tender. Adnexa:         Right: No mass, tenderness or fullness. Left: No mass, tenderness or fullness. Rectum: No anal fissure or external hemorrhoid. Comments: Normal urethral meatus, normal urethra, nl bladder    IUD string at os  Musculoskeletal: Normal range of motion. General: No tenderness. Lymphadenopathy:      Cervical: No cervical adenopathy. Lower Body: No right inguinal adenopathy. No left inguinal adenopathy. Skin:     General: Skin is warm and dry. Coloration: Skin is not pale. Findings: No erythema or rash. Neurological:      General: No focal deficit present. Mental Status: She is alert and oriented to person, place, and time. Mental status is at baseline. Deep Tendon Reflexes: Reflexes are normal and symmetric. Psychiatric:         Mood and Affect: Mood normal.         Behavior: Behavior normal.         Thought Content: Thought content normal.         Judgment: Judgment normal.         Assessment:      1. Annual  2. paragard IUD      Plan:      1. Pap, calcium, exercise, mammogram at 41  2.  Will reorder or Jasmyn Andres MD

## 2021-05-07 ENCOUNTER — TELEPHONE (OUTPATIENT)
Dept: GYNECOLOGY | Age: 41
End: 2021-05-07

## 2021-05-07 LAB
C. TRACHOMATIS DNA,THIN PREP: NEGATIVE
N. GONORRHOEAE DNA, THIN PREP: NEGATIVE

## 2021-05-07 NOTE — TELEPHONE ENCOUNTER
Called patient at [phone] left a message for a return call. Called patient to let her know that she needs to go onto [employer] to electronically sign for her Pargard we apparently missed one of the signatures she needed while in the office. So instead of patient coming into office to sign she can go to [email or web address] [employer]  Navigate to and click the blue icon that says \" get started'  Follow the line that states \" Patients can provide their electronic signature for [facility] pharmacy clicking here. Apologize to patient for this.

## 2021-05-18 ENCOUNTER — TELEPHONE (OUTPATIENT)
Dept: GYNECOLOGY | Age: 41
End: 2021-05-18

## 2021-09-21 ENCOUNTER — PATIENT MESSAGE (OUTPATIENT)
Dept: FAMILY MEDICINE CLINIC | Age: 41
End: 2021-09-21

## 2021-09-21 DIAGNOSIS — E66.9 OBESITY (BMI 35.0-39.9 WITHOUT COMORBIDITY): Primary | ICD-10-CM

## 2021-10-01 ENCOUNTER — TELEPHONE (OUTPATIENT)
Dept: GYNECOLOGY | Age: 41
End: 2021-10-01

## 2021-10-01 NOTE — TELEPHONE ENCOUNTER
Called and left a detail message on patients VM letting her know that her Paragard IUD has arrived at our office. We will call her back with a date time for her to come into office to have insertion done. A message will be sent to Dr Syeda Barker as to when we can placed patient on schedule.     When would like to have patient come in to have Paragard put in?

## 2021-11-03 NOTE — TELEPHONE ENCOUNTER
Patient returned call to office. Informed patient of why we had called her. Patient asked what next step was. Informed her that I would have to send a message to Dr Rangel Gallegos as to when we can have her come in and have her Paragard inserted. Patient is aware someone from the office will be giving her a call once we get a date from Dr Rangel Gallegos.      Patient can be contacted at [phone]

## 2021-11-09 NOTE — TELEPHONE ENCOUNTER
Called left information regarding the date and the time, but will not put her on schedule until she calls back herself and accepts the appointment.

## 2021-12-15 ENCOUNTER — PROCEDURE VISIT (OUTPATIENT)
Dept: GYNECOLOGY | Age: 41
End: 2021-12-15
Payer: COMMERCIAL

## 2021-12-15 VITALS
WEIGHT: 246 LBS | BODY MASS INDEX: 39.53 KG/M2 | RESPIRATION RATE: 17 BRPM | SYSTOLIC BLOOD PRESSURE: 110 MMHG | HEART RATE: 87 BPM | DIASTOLIC BLOOD PRESSURE: 70 MMHG | OXYGEN SATURATION: 98 % | HEIGHT: 66 IN

## 2021-12-15 DIAGNOSIS — Z30.430 ENCOUNTER FOR INSERTION OF COPPER INTRAUTERINE CONTRACEPTIVE DEVICE (IUD): ICD-10-CM

## 2021-12-15 DIAGNOSIS — Z34.90 PREGNANCY, UNSPECIFIED GESTATIONAL AGE: Primary | ICD-10-CM

## 2021-12-15 DIAGNOSIS — Z30.432 ENCOUNTER FOR IUD REMOVAL: ICD-10-CM

## 2021-12-15 LAB
CONTROL: NORMAL
PREGNANCY TEST URINE, POC: NORMAL

## 2021-12-15 PROCEDURE — 58301 REMOVE INTRAUTERINE DEVICE: CPT | Performed by: OBSTETRICS & GYNECOLOGY

## 2021-12-15 PROCEDURE — 58300 INSERT INTRAUTERINE DEVICE: CPT | Performed by: OBSTETRICS & GYNECOLOGY

## 2021-12-16 NOTE — PROGRESS NOTES
Patient is here for IUD removal and insertion of paragard IUD. Review of Systems: as above  General ROS: negative  Psychological ROS: negative  Ophthalmic ROS: negative  ENT ROS: negative  Allergy and Immunology ROS: negative  Hematological and Lymphatic ROS: negative  Endocrine ROS: negative  Breast ROS: negative  Respiratory ROS: negative  Cardiovascular ROS: negative  Gastrointestinal ROS: negative  Genito-Urinary ROS: as above  Musculoskeletal ROS: negative  Neurological ROS: negative  Dermatological ROS: negative    Date of Birth 1980  Past Medical History:   Diagnosis Date    Obesity (BMI 35.0-39.9 without comorbidity) 2017    Subclinical hypothyroidism 2017     No past surgical history on file.   OB History    Para Term  AB Living   2 2 2     2   SAB IAB Ectopic Molar Multiple Live Births                    # Outcome Date GA Lbr Ren/2nd Weight Sex Delivery Anes PTL Lv   2 Term 01/26/10 40w0d   F Vag-Spont      1 Term 07 40w0d   M Vag-Spont        Social History     Socioeconomic History    Marital status:      Spouse name: Not on file    Number of children: Not on file    Years of education: Not on file    Highest education level: Not on file   Occupational History    Not on file   Tobacco Use    Smoking status: Never Smoker    Smokeless tobacco: Never Used   Vaping Use    Vaping Use: Never used   Substance and Sexual Activity    Alcohol use: Yes     Comment: socially    Drug use: No    Sexual activity: Yes     Partners: Male   Other Topics Concern    Not on file   Social History Narrative    Not on file     Social Determinants of Health     Financial Resource Strain: Low Risk     Difficulty of Paying Living Expenses: Not hard at all   Food Insecurity: No Food Insecurity    Worried About Running Out of Food in the Last Year: Never true    Rita of Food in the Last Year: Never true   Transportation Needs: No Transportation Needs    Lack of Transportation (Medical): No    Lack of Transportation (Non-Medical):  No   Physical Activity:     Days of Exercise per Week: Not on file    Minutes of Exercise per Session: Not on file   Stress:     Feeling of Stress : Not on file   Social Connections:     Frequency of Communication with Friends and Family: Not on file    Frequency of Social Gatherings with Friends and Family: Not on file    Attends Yazidi Services: Not on file    Active Member of [address] [place] [address] [employer] or Organizations: Not on file    Attends Club or Organization Meetings: Not on file    Marital Status: Not on file   Intimate Partner Violence:     Fear of Current or Ex-Partner: Not on file    Emotionally Abused: Not on file    Physically Abused: Not on file    Sexually Abused: Not on file   Housing Stability:     Unable to Pay for Housing in the Last Year: Not on file    Number of [place] in the Last Year: Not on file    Unstable Housing in the Last Year: Not on file     Allergies   Allergen Reactions    Honey Bee Venom [Bee Venom] Anaphylaxis    Nutritional Supplements Shortness Of Breath    Benadryl [Diphenhydramine Hcl] Hives    Demerol Hives    Paula Nausea Only    Amoxicillin Nausea And Vomiting    Codeine Nausea And Vomiting    Erythromycin Nausea And Vomiting and Rash    Morphine Nausea And Vomiting    Pcn [Penicillins] Nausea And Vomiting    Percocet [Oxycodone-Acetaminophen] Nausea And Vomiting     No outpatient medications have been marked as taking for the 12/15/21 encounter (Procedure visit) with Sheyla Flynn MD.     Family History   Problem Relation Age of Onset    Lung Cancer Paternal Grandmother     Cancer Paternal Grandmother         melanoma, lung    Heart Disease Maternal Grandfather     Heart Disease Maternal Grandmother     Diabetes Maternal Grandmother     Cancer Maternal Grandmother         skin, NMSC    Heart Disease Paternal Grandfather     Hypertension Mother     Cancer Mother [facility]    Hypertension Father     Breast Cancer Paternal Aunt     Breast Cancer Maternal Aunt      /70 (Site: Right Upper Arm, Position: Sitting, Cuff Size: Large Adult)   Pulse 87   Resp 17   Ht 5' 6\" (1.676 m)   Wt 246 lb (111.6 kg)   LMP 12/04/2021   SpO2 98%   BMI 39.71 kg/m²     WDWN in NAD  A and O x 3  ABD-soft, NT, ND, no hsm  PV-nl efg, Normal urethral meatus, nl urethra, nl bladder. , nl cervix, nl vagina, nl uterus with no masses, NT. Nl adnexa with no masses, NT.  -IUD string seen at os    Plan-patient is a 37year old F  1. For IUD removal and insertion. Procedure-strings seen at os and IUD strings grasped and IUD removed. Procedure-IUD insertion. Cervix cleansed with betadine. Cervix grasped with single tooth tenaculum. Uterus sounded to 9 cm. paragard IUD placed without diffficulty. Strings trimmed.

## 2022-08-16 ENCOUNTER — OFFICE VISIT (OUTPATIENT)
Dept: FAMILY MEDICINE CLINIC | Age: 42
End: 2022-08-16
Payer: COMMERCIAL

## 2022-08-16 VITALS
OXYGEN SATURATION: 98 % | DIASTOLIC BLOOD PRESSURE: 78 MMHG | SYSTOLIC BLOOD PRESSURE: 124 MMHG | HEART RATE: 77 BPM | BODY MASS INDEX: 41.27 KG/M2 | WEIGHT: 256.8 LBS | HEIGHT: 66 IN

## 2022-08-16 DIAGNOSIS — Z13.1 SCREENING FOR DIABETES MELLITUS: ICD-10-CM

## 2022-08-16 DIAGNOSIS — Z11.4 SCREENING FOR HIV (HUMAN IMMUNODEFICIENCY VIRUS): ICD-10-CM

## 2022-08-16 DIAGNOSIS — Z97.5 IUD (INTRAUTERINE DEVICE) IN PLACE: ICD-10-CM

## 2022-08-16 DIAGNOSIS — Z00.00 PREVENTATIVE HEALTH CARE: Primary | ICD-10-CM

## 2022-08-16 DIAGNOSIS — Z91.030 ALLERGY TO HONEY BEE VENOM: ICD-10-CM

## 2022-08-16 DIAGNOSIS — Z11.59 ENCOUNTER FOR HEPATITIS C SCREENING TEST FOR LOW RISK PATIENT: ICD-10-CM

## 2022-08-16 DIAGNOSIS — E78.2 MIXED HYPERLIPIDEMIA: ICD-10-CM

## 2022-08-16 DIAGNOSIS — E03.9 HYPOTHYROIDISM, UNSPECIFIED TYPE: ICD-10-CM

## 2022-08-16 PROCEDURE — 99386 PREV VISIT NEW AGE 40-64: CPT

## 2022-08-16 RX ORDER — FLUCONAZOLE 100 MG/1
100 TABLET ORAL PRN
COMMUNITY

## 2022-08-16 RX ORDER — EPINEPHRINE 0.3 MG/.3ML
INJECTION SUBCUTANEOUS
Qty: 2 EACH | Refills: 1 | Status: SHIPPED | OUTPATIENT
Start: 2022-08-16

## 2022-08-16 ASSESSMENT — PATIENT HEALTH QUESTIONNAIRE - PHQ9
2. FEELING DOWN, DEPRESSED OR HOPELESS: 0
3. TROUBLE FALLING OR STAYING ASLEEP: 0
9. THOUGHTS THAT YOU WOULD BE BETTER OFF DEAD, OR OF HURTING YOURSELF: 0
10. IF YOU CHECKED OFF ANY PROBLEMS, HOW DIFFICULT HAVE THESE PROBLEMS MADE IT FOR YOU TO DO YOUR WORK, TAKE CARE OF THINGS AT HOME, OR GET ALONG WITH OTHER PEOPLE: 0
1. LITTLE INTEREST OR PLEASURE IN DOING THINGS: 0
8. MOVING OR SPEAKING SO SLOWLY THAT OTHER PEOPLE COULD HAVE NOTICED. OR THE OPPOSITE, BEING SO FIGETY OR RESTLESS THAT YOU HAVE BEEN MOVING AROUND A LOT MORE THAN USUAL: 0
5. POOR APPETITE OR OVEREATING: 0
SUM OF ALL RESPONSES TO PHQ QUESTIONS 1-9: 0
6. FEELING BAD ABOUT YOURSELF - OR THAT YOU ARE A FAILURE OR HAVE LET YOURSELF OR YOUR FAMILY DOWN: 0
SUM OF ALL RESPONSES TO PHQ9 QUESTIONS 1 & 2: 0
4. FEELING TIRED OR HAVING LITTLE ENERGY: 0
SUM OF ALL RESPONSES TO PHQ QUESTIONS 1-9: 0
SUM OF ALL RESPONSES TO PHQ QUESTIONS 1-9: 0
7. TROUBLE CONCENTRATING ON THINGS, SUCH AS READING THE NEWSPAPER OR WATCHING TELEVISION: 0
SUM OF ALL RESPONSES TO PHQ QUESTIONS 1-9: 0

## 2022-08-16 ASSESSMENT — ENCOUNTER SYMPTOMS
DIARRHEA: 0
COUGH: 0
CONSTIPATION: 0
BLOOD IN STOOL: 0
SHORTNESS OF BREATH: 0
WHEEZING: 0
ABDOMINAL PAIN: 0
COLOR CHANGE: 0
SORE THROAT: 0

## 2022-08-16 NOTE — PROGRESS NOTES
[address] [facility],[address] (:  1980) is a 40 y.o. female,New patient, here for evaluation of the following chief complaint(s):  New Patient (Pt would like to establish care, she is not fasting today, discuss prescription for epipen) and Vaginal Bleeding (Has iud - has had continuous bleeding since end , needs new referral to gyn )         ASSESSMENT/PLAN:  1. Preventative health care  -Overall patient believes that she is in fairly good health. Patient is here to establish care. Patient also would like to request routine screening. We will plan to follow with routine screening listed below. 2. Screening for HIV (human immunodeficiency virus)  -     HIV Screen  3. Encounter for hepatitis C screening test for low risk patient  -     Hepatitis C Antibody; Future  4. Hypothyroidism, unspecified type  -     TSH with Reflex; Future  -     CBC; Future  -     Basic Metabolic Panel; Future  5. Mixed hyperlipidemia  -     Lipid Panel; Future  6. Screening for diabetes mellitus  -     Hemoglobin A1C; Future  7. IUD (intrauterine device) in place  -     [address], Mahsa Hampton, DO, Gynecology, [place]  -Patient is requesting a referral to a new OB/GYN. She states that hers is too far for her to drive to. Patient also states she has been having some bleeding since having her IUD changed. 8. Allergy to honey bee venom  -     EPINEPHrine (EPIPEN) 0.3 MG/0.3ML SOAJ injection; Use as directed for allergic reaction, Disp-2 each, R-1PLEASE DISPENSE VERSION THAT IS ON HER FORMULARY AND INEXPENSIVE. Normal      Return in about 3 months (around 2022) for weight loss . Subjective   SUBJECTIVE/OBJECTIVE:  HPI  Patient presents the office today as a new patient to the office. Patient states that she is here to establish care. Patient states that she was seeing a previous PCP. Patient states that she is currently on Contrave for weight loss. Patient states that she does not feel that this is helping her. Patient also states that she has had borderline thyroid was on medication and then off medication. Patient has no other acute concerns at this time besides weight loss. Review of Systems   HENT:  Negative for congestion and sore throat. Respiratory:  Negative for cough, shortness of breath and wheezing. Cardiovascular:  Negative for chest pain and leg swelling. Gastrointestinal:  Negative for abdominal pain, blood in stool, constipation and diarrhea. Endocrine: Negative for cold intolerance and heat intolerance. Genitourinary:  Negative for difficulty urinating, hematuria and urgency. Musculoskeletal:  Negative for arthralgias and gait problem. Skin:  Negative for color change. Neurological:  Negative for dizziness, seizures, light-headedness and headaches. Psychiatric/Behavioral:  Negative for agitation and confusion. The patient is not nervous/anxious. Objective   Physical Exam  Constitutional:       General: She is not in acute distress. Appearance: Normal appearance. She is obese. HENT:      Right Ear: Tympanic membrane normal.      Left Ear: Tympanic membrane normal.   Eyes:      Pupils: Pupils are equal, round, and reactive to light. Cardiovascular:      Rate and Rhythm: Normal rate and regular rhythm. Pulmonary:      Effort: Pulmonary effort is normal.      Breath sounds: Normal breath sounds. Abdominal:      General: Abdomen is flat. Bowel sounds are normal.      Palpations: Abdomen is soft. Musculoskeletal:         General: Normal range of motion. Skin:     General: Skin is warm. Neurological:      General: No focal deficit present. Mental Status: She is alert. Psychiatric:         Mood and Affect: Mood normal.         Behavior: Behavior normal.         Judgment: Judgment normal.          This note was generated completely or in part utilizing Dragon dictation speech recognition software.   Occasionally, words are mistranscribed and despite editing, the text may contain inaccuracies due to incorrect word recognition. If further clarification is needed please contact the office at [phone]. An electronic signature was used to authenticate this note.     --SELVIN Milligan - CNP

## 2022-08-17 LAB
HIV AG/AB: NORMAL
HIV ANTIGEN: NORMAL
HIV-1 ANTIBODY: NORMAL
HIV-2 AB: NORMAL

## 2022-12-09 NOTE — TELEPHONE ENCOUNTER
Dylan Rueda [phone] (home)    is requesting refill(s) of medication Fluconazole 100mg to preferred [address] [facility] 8/16/22 (pertaining to medication)   Last refill  (per medication requested)  Next office visit scheduled or attempted Yes  Date 1/4/23

## 2022-12-13 RX ORDER — FLUCONAZOLE 100 MG/1
100 TABLET ORAL PRN
Qty: 1 TABLET | Refills: 0 | Status: SHIPPED | OUTPATIENT
Start: 2022-12-13

## 2022-12-27 RX ORDER — FLUCONAZOLE 100 MG/1
100 TABLET ORAL PRN
Qty: 30 TABLET | Refills: 0 | Status: SHIPPED | OUTPATIENT
Start: 2022-12-27

## 2022-12-29 ENCOUNTER — TELEPHONE (OUTPATIENT)
Dept: FAMILY MEDICINE CLINIC | Age: 42
End: 2022-12-29

## 2022-12-29 NOTE — TELEPHONE ENCOUNTER
Pharmacy needs to know frequency, I let them know it was 1 tablet by mouth as needed and they said they needed to know how many times a day

## 2022-12-29 NOTE — TELEPHONE ENCOUNTER
Pharmacy called and stated that they needed a max dose/ frequency for the patients Fluconazole 100mg.

## 2023-01-04 ENCOUNTER — OFFICE VISIT (OUTPATIENT)
Dept: FAMILY MEDICINE CLINIC | Age: 43
End: 2023-01-04
Payer: COMMERCIAL

## 2023-01-04 VITALS
RESPIRATION RATE: 16 BRPM | OXYGEN SATURATION: 99 % | HEIGHT: 66 IN | SYSTOLIC BLOOD PRESSURE: 108 MMHG | DIASTOLIC BLOOD PRESSURE: 84 MMHG | BODY MASS INDEX: 41.56 KG/M2 | WEIGHT: 258.6 LBS | HEART RATE: 85 BPM

## 2023-01-04 DIAGNOSIS — E66.9 OBESITY (BMI 35.0-39.9 WITHOUT COMORBIDITY): ICD-10-CM

## 2023-01-04 DIAGNOSIS — Z11.59 ENCOUNTER FOR HEPATITIS C SCREENING TEST FOR LOW RISK PATIENT: ICD-10-CM

## 2023-01-04 DIAGNOSIS — E03.9 HYPOTHYROIDISM, UNSPECIFIED TYPE: ICD-10-CM

## 2023-01-04 DIAGNOSIS — Z13.1 SCREENING FOR DIABETES MELLITUS: ICD-10-CM

## 2023-01-04 PROCEDURE — 99214 OFFICE O/P EST MOD 30 MIN: CPT

## 2023-01-04 RX ORDER — SEMAGLUTIDE 0.25 MG/.5ML
0.25 INJECTION, SOLUTION SUBCUTANEOUS
Qty: 2 ML | Refills: 0 | Status: SHIPPED | OUTPATIENT
Start: 2023-01-04 | End: 2023-02-03

## 2023-01-04 ASSESSMENT — ENCOUNTER SYMPTOMS
COLOR CHANGE: 0
WHEEZING: 0
CONSTIPATION: 0
SORE THROAT: 0
SHORTNESS OF BREATH: 0
DIARRHEA: 0
COUGH: 0
ABDOMINAL PAIN: 0
BLOOD IN STOOL: 0

## 2023-01-04 ASSESSMENT — PATIENT HEALTH QUESTIONNAIRE - PHQ9
SUM OF ALL RESPONSES TO PHQ QUESTIONS 1-9: 0
SUM OF ALL RESPONSES TO PHQ QUESTIONS 1-9: 0
4. FEELING TIRED OR HAVING LITTLE ENERGY: 0
8. MOVING OR SPEAKING SO SLOWLY THAT OTHER PEOPLE COULD HAVE NOTICED. OR THE OPPOSITE, BEING SO FIGETY OR RESTLESS THAT YOU HAVE BEEN MOVING AROUND A LOT MORE THAN USUAL: 0
7. TROUBLE CONCENTRATING ON THINGS, SUCH AS READING THE NEWSPAPER OR WATCHING TELEVISION: 0
2. FEELING DOWN, DEPRESSED OR HOPELESS: 0
SUM OF ALL RESPONSES TO PHQ9 QUESTIONS 1 & 2: 0
SUM OF ALL RESPONSES TO PHQ QUESTIONS 1-9: 0
6. FEELING BAD ABOUT YOURSELF - OR THAT YOU ARE A FAILURE OR HAVE LET YOURSELF OR YOUR FAMILY DOWN: 0
9. THOUGHTS THAT YOU WOULD BE BETTER OFF DEAD, OR OF HURTING YOURSELF: 0
SUM OF ALL RESPONSES TO PHQ QUESTIONS 1-9: 0
1. LITTLE INTEREST OR PLEASURE IN DOING THINGS: 0
3. TROUBLE FALLING OR STAYING ASLEEP: 0
5. POOR APPETITE OR OVEREATING: 0
10. IF YOU CHECKED OFF ANY PROBLEMS, HOW DIFFICULT HAVE THESE PROBLEMS MADE IT FOR YOU TO DO YOUR WORK, TAKE CARE OF THINGS AT HOME, OR GET ALONG WITH OTHER PEOPLE: 0

## 2023-01-04 NOTE — PROGRESS NOTES
[address] [facility],[address] (:  1980) is a 40 y.o. female,Established patient, here for evaluation of the following chief complaint(s):  Weight Loss (Pt is here for 3 month follow up of weight loss )         ASSESSMENT/PLAN:  1. Obesity (BMI 35.0-39.9 without comorbidity)  - Did have discussions with patient on alternative weight loss medications. Ultimately was decided that patient will stop Contrave due to ineffectiveness. Did discuss alternative options with the patient's including Wegovy. Patient was educated that she should continue diet and exercise plan as instructed. Plan at this time is to send [facility] to the pharmacy. Patient was educated on side effects of medications patient is agreeable to initiation of medication. If Wegovy cannot be approved would consider recommending and referring patient to weight loss management for further evaluation and options. Patient is agreeable understands plan at this time. 2. Screening for diabetes mellitus  -     Hemoglobin A1C  3. Hypothyroidism, unspecified type  -     Basic Metabolic Panel  -     CBC  -     TSH with Reflex  4. Encounter for hepatitis C screening test for low risk patient  -     Hepatitis C Antibody      No follow-ups on file. Subjective   SUBJECTIVE/OBJECTIVE:  HPI  Patient presents the office today for follow-up regarding obesity and hypothyroidism. Patient did not complete the labs at last office visit for her thyroid. Patient also states that she is still struggling with losing weight. Patient states that she is doing a better job with diet and exercise but is still not successfully losing any weight. Patient states that she is now having a Peloton at home and is exercising even more with minimal weight loss. Patient is open to other options. Patient has been on Contrave with no success in weight loss. Review of weight in the past shows no significant weight loss.   Patient denied any side effects of medication but is not having any weight loss. Patient has no other acute concerns at this time patient's main follow-up was ongoing help with weight loss. Review of Systems   HENT:  Negative for congestion and sore throat. Respiratory:  Negative for cough, shortness of breath and wheezing. Cardiovascular:  Negative for chest pain and leg swelling. Gastrointestinal:  Negative for abdominal pain, blood in stool, constipation and diarrhea. Endocrine: Negative for cold intolerance and heat intolerance. Genitourinary:  Negative for difficulty urinating, hematuria and urgency. Musculoskeletal:  Negative for arthralgias and gait problem. Skin:  Negative for color change. Neurological:  Negative for dizziness, seizures, light-headedness and headaches. Psychiatric/Behavioral:  Negative for agitation and confusion. The patient is not nervous/anxious. Objective   Physical Exam  Vitals reviewed. Constitutional:       General: She is not in acute distress. Appearance: Normal appearance. She is obese. HENT:      Right Ear: Tympanic membrane normal.      Left Ear: Tympanic membrane normal.   Eyes:      Pupils: Pupils are equal, round, and reactive to light. Cardiovascular:      Rate and Rhythm: Normal rate and regular rhythm. Pulmonary:      Effort: Pulmonary effort is normal.      Breath sounds: Normal breath sounds. Abdominal:      General: Abdomen is flat. Bowel sounds are normal.      Palpations: Abdomen is soft. Musculoskeletal:         General: Normal range of motion. Skin:     General: Skin is warm. Neurological:      General: No focal deficit present. Mental Status: She is alert. Psychiatric:         Mood and Affect: Mood normal.         Behavior: Behavior normal.         Judgment: Judgment normal.          This note was generated completely or in part utilizing Dragon dictation speech recognition software.   Occasionally, words are mistranscribed and despite editing, the text may contain inaccuracies due to incorrect word recognition. If further clarification is needed please contact the office at [phone]. An electronic signature was used to authenticate this note.     --SELVIN Fleming - CNP

## 2023-01-05 ENCOUNTER — TELEPHONE (OUTPATIENT)
Dept: FAMILY MEDICINE CLINIC | Age: 43
End: 2023-01-05

## 2023-01-05 LAB
ANION GAP SERPL CALCULATED.3IONS-SCNC: 10 MMOL/L (ref 3–16)
BUN BLDV-MCNC: 10 MG/DL (ref 7–20)
CALCIUM SERPL-MCNC: 9.1 MG/DL (ref 8.3–10.6)
CHLORIDE BLD-SCNC: 99 MMOL/L (ref 99–110)
CO2: 25 MMOL/L (ref 21–32)
CREAT SERPL-MCNC: 0.7 MG/DL (ref 0.6–1.1)
ESTIMATED AVERAGE GLUCOSE: 105.4 MG/DL
GFR SERPL CREATININE-BSD FRML MDRD: >60 ML/MIN/{1.73_M2}
GLUCOSE BLD-MCNC: 90 MG/DL (ref 70–99)
HBA1C MFR BLD: 5.3 %
HCT VFR BLD CALC: 41.6 % (ref 36–48)
HEMOGLOBIN: 13.2 G/DL (ref 12–16)
HEPATITIS C ANTIBODY INTERPRETATION: NORMAL
MCH RBC QN AUTO: 26 PG (ref 26–34)
MCHC RBC AUTO-ENTMCNC: 31.8 G/DL (ref 31–36)
MCV RBC AUTO: 81.9 FL (ref 80–100)
PDW BLD-RTO: 14.9 % (ref 12.4–15.4)
PLATELET # BLD: 305 K/UL (ref 135–450)
PMV BLD AUTO: 8.4 FL (ref 5–10.5)
POTASSIUM SERPL-SCNC: 4.7 MMOL/L (ref 3.5–5.1)
RBC # BLD: 5.08 M/UL (ref 4–5.2)
SODIUM BLD-SCNC: 134 MMOL/L (ref 136–145)
T4 FREE: 1 NG/DL (ref 0.9–1.8)
TSH REFLEX: 5.36 UIU/ML (ref 0.27–4.2)
WBC # BLD: 8 K/UL (ref 4–11)

## 2023-01-05 NOTE — TELEPHONE ENCOUNTER
A prior authorization has been started for [facility]LUCILA ALVARENGA 0.25/0.5ml auto injectors, form scanned to encounter

## 2023-01-06 NOTE — TELEPHONE ENCOUNTER
Submitted PA for [facility] KEVAN ALVARENGA 0.25MG/0.5ML auto-injectors, Key: [id number]. Medication has been DENIED. Denial letter attached. Please notify patient. Thank you.

## 2023-05-29 NOTE — TELEPHONE ENCOUNTER
Patient Name: Ariana Martinez  : 1961    MRN: [id number]                              Today's Date: 2023       Admit Date: 2023    Visit Dx:     ICD-10-CM ICD-9-CM   1. Sepsis without acute organ dysfunction, due to unspecified organism  A41.9 038.9     995.91   2. Cellulitis of right lower extremity  L03.115 682.6   3. Type 2 diabetes mellitus with hyperglycemia, unspecified whether long term insulin use  E11.65 250.00   4. Impaired mobility and activities of daily living  Z74.09 V49.89    Z78.9    5. Impaired physical mobility  Z74.09 781.99   6. Impaired mobility and ADLs  Z74.09 V49.89    Z78.9    7. Urinary retention  R33.9 788.20     Patient Active Problem List   Diagnosis   • Uncontrolled type 2 diabetes mellitus with neurologic complication, with long-term current use of insulin   • Closed nondisplaced fracture of fifth left metatarsal bone   • MAURICIO (generalized anxiety disorder)   • Depression, major, recurrent, moderate (HCC)   • GERD without esophagitis   • Long term prescription opiate use   • Mixed hyperlipidemia   • Vitamin D deficiency   • Seasonal allergic rhinitis   • Restrictive lung disease secondary to obesity   • Adult body mass index 37.0-37.9   • Snoring   • Class 3 severe obesity due to excess calories without serious comorbidity with body mass index (BMI) of 40.0 to 44.9 in adult (HCC)   • (HFpEF) heart failure with preserved ejection fraction   • Type 2 diabetes mellitus with hyperglycemia, with long-term current use of insulin ([facility])   • Cyanocobalamin deficiency   • Coronary artery disease of native artery of native heart with stable angina pectoris   • Hypertension   • Meniere's disease   • Gastroparesis   • Pulmonary hypertension ([facility])   • Pes cavus   • Primary osteoarthritis involving multiple joints   • Generalized anxiety disorder   • Chronic right-sided low back pain with right-sided sciatica   • Chest pain   • Adverse effect of iron   • Chest pain due to myocardial  Patient called because her prescription only had 1 pill and she stated that she normally takes this as needed and was just wondering if she could have more sent in.      [place] [phone] (home)    is requesting refill(s) of medication Diflucan  100mg to [place] [phone] [facility] 8/16/22 (pertaining to medication)   Last refill 12/13/22 (per medication requested)  Next office visit scheduled or attempted Yes  Date 1/4/23 ischemia   • Nonrheumatic tricuspid valve regurgitation   • Iron deficiency anemia due to chronic blood loss   • Malaise and fatigue   • Ankle arthritis   • Urinary retention   • Endocarditis   • Essential hypertension   • Occlusion and stenosis of bilateral carotid arteries   • S/P BKA (below knee amputation) unilateral, left (HCC)   • Phantom pain after amputation of lower extremity   • S/P CABG (coronary artery bypass graft)   • Severe malnutrition   • TIA (transient ischemic attack)   • Coronary artery abnormality   • Elevated d-dimer   • Neuropathy   • Chest pain, unspecified type   • Acute pain of right shoulder   • Rotator cuff syndrome, right   • Acquired hypothyroidism   • Bilateral leg edema   • Left elbow pain   • Gastritis   • Olecranon bursitis, left elbow   • Sepsis without acute organ dysfunction, due to unspecified organism     Past Medical History:   Diagnosis Date   • Acute bacterial endocarditis 3/13/2021   • Angina, class IV    • Anxiety    • Anxiety and depression    • Arthritis    • Benign paroxysmal positional vertigo    • Bladder disorder     has bladder stimulator   • Carpal tunnel syndrome    • CHF (congestive heart failure)    • Chronic pain    • Coronary atherosclerosis     hx CABG 2005.  is followed by Dr Kwon   • Depression    • Diabetes mellitus     Type 2, controlled   • Diabetic polyneuropathy    • Disease of thyroid gland    • Elevated cholesterol    • Female stress incontinence    • Foot pain, left    • Full dentures    • Gastroparesis    • GERD (gastroesophageal reflux disease)    • Hyperlipidemia    • Hypertension    • Low back pain    • Malaise and fatigue    • Multiple joint pain    • Obesity     Refuses to be weighed   • Occlusion and stenosis of bilateral carotid arteries 6/18/2021   • Otalgia     Both   • Perforation of tympanic membrane     Left   • Postoperative wound infection    • Vitamin D deficiency    • Wears glasses     reading     Past Surgical History:    Procedure Laterality Date   • ABDOMINAL SURGERY     • AMPUTATION FOOT     • ANGIOPLASTY      coronary   • ANKLE ARTHROSCOPY Left 02/26/2021    Procedure: Left foot hardware removal, ankle arthroscopy, bone grafting , left foot exostectomy;  Surgeon: Ignacio Lord DPM;  Location: [facility] OR;  Service: Podiatry;  Laterality: Left;   • BREAST BIOPSY Right    • CARDIAC CATHETERIZATION     • CARDIAC CATHETERIZATION N/A 06/20/2017    Procedure: Right Heart Cath;  Surgeon: Can Kwon MD PhD;  Location: [facility] CATH INVASIVE LOCATION;  Service:    • CARDIAC CATHETERIZATION N/A 02/18/2020    Procedure: Left Heart Cath;  Surgeon: Catalina Cooper MD;  Location: [facility] CATH INVASIVE LOCATION;  Service: Cardiology;  Laterality: N/A;   • CARDIAC CATHETERIZATION N/A 04/06/2022    Procedure: Left Heart Cath;  Surgeon: Sheryl Navas MD;  Location: [facility] CATH INVASIVE LOCATION;  Service: Cardiology;  Laterality: N/A;   • CARPAL TUNNEL RELEASE     • CHOLECYSTECTOMY     • COLONOSCOPY N/A 06/24/2020    Procedure: COLONOSCOPY;  Surgeon: Julián Maldonado MD;  Location: [facility] ENDOSCOPY;  Service: Gastroenterology;  Laterality: N/A;   • CORONARY ARTERY BYPASS GRAFT  2005   • ENDOSCOPY N/A 10/19/2018    Procedure: ESOPHAGOGASTRODUODENOSCOPY possible dilation;  Surgeon: Julián Maldonado MD;  Location: [facility] ENDOSCOPY;  Service: Gastroenterology   • ENDOSCOPY N/A 06/24/2020    Procedure: ESOPHAGOGASTRODUODENOSCOPY WED appt please;  Surgeon: Julián Maldonado MD;  Location: [facility] ENDOSCOPY;  Service: Gastroenterology;  Laterality: N/A;   • ENDOSCOPY N/A 06/10/2022    Procedure: ESOPHAGOGASTRODUODENOSCOPY   room 380;  Surgeon: Jeremiah Wilkins MD;  Location: [facility] ENDOSCOPY;  Service: Gastroenterology;  Laterality: N/A;   • ENDOSCOPY N/A 1/10/2023    Procedure: ESOPHAGOGASTRODUODENOSCOPY;  Surgeon: Jeremiah Wilkins MD;  Location: [facility] ENDOSCOPY;  Service: Gastroenterology;  Laterality: N/A;   • ENDOSCOPY AND  COLONOSCOPY     • FOOT SURGERY      Toes   • FOOT SURGERY     • GASTRIC BANDING      Revision, laparoscopic   • HYSTERECTOMY     • INCISION AND DRAINAGE LEG Left 03/12/2021    Procedure: Left ankle arthroscopic irrigation and debridement, screw removal;  Surgeon: Ignacio Lord DPM;  Location: [facility];  Service: Podiatry;  Laterality: Left;   • MOUTH SURGERY     • SALPINGO OOPHORECTOMY     • SHOULDER SURGERY     • SUBTALAR ARTHRODESIS Left 01/16/2019    Procedure: LEFT FOOT HARDWARE REMOVAL, FIFTH METATARSAL , OPEN REDUCTION INTERNAL FIXATION, CALCANEAL OSTEOTOMY;  Surgeon: Ignacio Lord DPM;  Location: [facility];  Service: Podiatry   • SUBTALAR ARTHRODESIS Left 10/16/2019    Procedure: foot hardware removal, subtalar joint fusion  possible de/reattachment of achilles tendon        (c-arm);  Surgeon: Ignacio Lord DPM;  Location: [facility];  Service: Podiatry   • SUBTALAR ARTHRODESIS Left 09/30/2020    Procedure: subtalar, talonavicular joint arthrodesis.  Removal hardware.          (c-arm);  Surgeon: Ignacio Lord DPM;  Location: [facility];  Service: Podiatry;  Laterality: Left;   • TRANSESOPHAGEAL ECHOCARDIOGRAM (LAMONTE)      With color flow      General Information     Row Name 05/29/23 0740          OT Time and Intention    Document Type progress note/recertification  -     Mode of Treatment occupational therapy  -     Row Name 05/29/23 0740          General Information    Patient Profile Reviewed yes  -     Existing Precautions/Restrictions fall  -SJ     Barriers to Rehab medically complex  -     Row Name 05/29/23 0740          Cognition    Orientation Status (Cognition) oriented x 4  -     Row Name 05/29/23 0740          Safety Issues, Functional Mobility    Impairments Affecting Function (Mobility) endurance/activity tolerance;pain;strength  -           User Key  (r) = Recorded By, (t) = Taken By, (c) = Cosigned By    Initials Name Provider Type    SJ Francisco Randall, OT  Occupational Therapist                 Mobility/ADL's     Row Name 05/29/23 0740          Bed Mobility    Supine-Sit [place] (Bed Mobility) modified independence  -     Assistive Device (Bed Mobility) bed rails;head of bed elevated  -[facility] Name 05/29/23 0740          Transfers    Transfers sit-stand transfer;toilet transfer  -[facility] Name 05/29/23 0740          Bed-Chair Transfer    Bed-Chair [place] (Transfers) contact guard  -     Comment, (Bed-Chair Transfer) stand pivot, no AE used  -[facility] Name 05/29/23 0740          Sit-Stand Transfer    Sit-Stand [place] (Transfers) contact guard  -[facility] Name 05/29/23 0740          Stand-Sit Transfer    Stand-Sit [place] (Transfers) contact guard  -[facility] Name 05/29/23 0740          Toilet Transfer    Type (Toilet Transfer) sit-stand;stand-sit  -     [place]ell Level (Toilet Transfer) contact guard  -     Assistive Device (Toilet Transfer) commode  -     Comment, (Toilet Transfer) stand pivot to BSC  -[facility] Name 05/29/23 0740          Activities of Daily Living    BADL Assessment/Intervention toileting;grooming  -SJ     Row Name 05/29/23 0740          Grooming Assessment/Training    [place] Level (Grooming) set up  -[facility] 05/29/23 0740          Lower Body Dressing Assessment/Training    [place] Level (Lower Body Dressing) doff;don;socks;contact guard assist  -     Position (Lower Body Dressing) edge of bed sitting  -[facility] Name 05/29/23 0740          Toileting Assessment/Training    [place] Level (Toileting) moderate assist (50% patient effort)  -           User Key  (r) = Recorded By, (t) = Taken By, (c) = Cosigned By    Initials Name Provider Type     Francisco Randall OT Occupational Therapist               Obj/Interventions     Row Name 05/29/23 0740          Sensory Assessment (Somatosensory)    Sensory Assessment (Somatosensory) UE sensation intact  -[facility] Name 05/29/23  0740          Range of Motion Comprehensive    General Range of Motion bilateral upper extremity ROM WFL  -     Row Name 05/29/23 0740          Strength Comprehensive (MMT)    General Manual Muscle Testing (MMT) Assessment other (see comments)  -     Comment, General Manual Muscle Testing (MMT) Assessment BUE 4/5 grossly  -     Row Name 05/29/23 0740          Shoulder (Therapeutic Exercise)    Shoulder (Therapeutic Exercise) strengthening exercise  -     Shoulder Strengthening (Therapeutic Exercise) bilateral;flexion;extension;horizontal aBduction/aDduction;10 repetitions;3 sets  -     Row Name 05/29/23 0740          Elbow/Forearm (Therapeutic Exercise)    Elbow/Forearm (Therapeutic Exercise) strengthening exercise  -     Elbow/Forearm Strengthening (Therapeutic Exercise) bilateral;flexion;extension;3 lb free weight;10 repetitions;3 sets  -     Row Name 05/29/23 0740          Wrist (Therapeutic Exercise)    Wrist (Therapeutic Exercise) strengthening exercise  -     Wrist Strengthening (Therapeutic Exercise) bilateral;flexion;extension;3 second hold;10 repetitions  -     Row Name 05/29/23 0740          Hand (Therapeutic Exercise)    Hand (Therapeutic Exercise) strengthening exercise  -     Hand Strengthening (Therapeutic Exercise) bilateral;finger extension;finger flexion;10 repetitions  -     Row Name 05/29/23 0740          Motor Skills    Therapeutic Exercise elbow/forearm;shoulder;wrist;hand  -           User Key  (r) = Recorded By, (t) = Taken By, (c) = Cosigned By    Initials Name Provider Type     Francisco Randall, OT Occupational Therapist               Goals/Plan     Row Name 05/29/23 0740          Transfer Goal 1 (OT)    Activity/Assistive Device (Transfer Goal 1, OT) toilet;wheelchair transfer  -     [place] Level/Cues Needed (Transfer Goal 1, OT) modified independence  -     Time Frame (Transfer Goal 1, OT) long term goal (LTG)  -     Progress/Outcome (Transfer Goal 1,  OT) goal not met  -[facility] Name 05/29/23 0740          Bathing Goal 1 (OT)    Activity/Device (Bathing Goal 1, OT) bathing skills, all  -SJ     [place] Level/Cues Needed (Bathing Goal 1, OT) modified independence  -SJ     Time Frame (Bathing Goal 1, OT) long term goal (LTG)  -SJ     Progress/Outcomes (Bathing Goal 1, OT) goal not met  -SJ     Row Name 05/29/23 0740          Dressing Goal 1 (OT)    Activity/Device (Dressing Goal 1, OT) lower body dressing  -SJ     [place]/Cues Needed (Dressing Goal 1, OT) modified independence  -SJ     Time Frame (Dressing Goal 1, OT) long term goal (LTG)  -SJ     Progress/Outcome (Dressing Goal 1, OT) goal not met  -SJ     Row Name 05/29/23 0740          Toileting Goal 1 (OT)    Activity/Device (Toileting Goal 1, OT) toileting skills, all  -     [place] Level/Cues Needed (Toileting Goal 1, OT) modified independence  -SJ     Time Frame (Toileting Goal 1, OT) long term goal (LTG)  -SJ     Progress/Outcome (Toileting Goal 1, OT) goal not met  -[facility] Name 05/29/23 0740          Therapy Assessment/Plan (OT)    Planned Therapy Interventions (OT) activity tolerance training;adaptive equipment training;BADL retraining;edema control/reduction;cognitive/visual perception retraining;neuromuscular control/coordination retraining;manual therapy/joint mobilization;IADL retraining;functional balance retraining;occupation/activity based interventions;ROM/therapeutic exercise;strengthening exercise;passive ROM/stretching;transfer/mobility retraining;patient/caregiver education/training  -           User Key  (r) = Recorded By, (t) = Taken By, (c) = Cosigned By    Initials Name Provider Type     Francisco Randall, OT Occupational Therapist               Clinical Impression     Row Name 05/29/23 0740          Pain Assessment    Pretreatment Pain Rating 7/10  -SJ     Posttreatment Pain Rating 6/10  -SJ     Pain Location - Side/Orientation Bilateral;Right  -SJ     Pain  Location lower  -SJ     Pain Location - extremity  -     Pain Intervention(s) Ambulation/increased activity;Repositioned  -     Row Name 05/29/23 0740          Plan of Care Review    Plan of Care Reviewed With patient;spouse  -     Outcome Evaluation OT re-cert complete, patient motivated in therapy and reports possibly going to ARU. Supine to sit with modified independence. EOB, 3 sets x 10 reps of BUE exercises, 3 lbs dowel performed. Sit to stand, stand pivot bed to BSC, stand pivot from BSC to recliener with CGA. LE dressing with CGA, toileting mod A, grooming with set up. Up in recliner, all needs inr each. Patient is a good rehab candidate. Goals updated this date, as patient has achieved several OT goals, patient continues to progress.  -     Row Name 05/29/23 0740          Therapy Assessment/Plan (OT)    Patient/Family Therapy Goal Statement (OT) rehab to home  -     Rehab Potential (OT) good, to achieve stated therapy goals  -     Criteria for Skilled Therapeutic Interventions Met (OT) yes;skilled treatment is necessary  -     Therapy Frequency (OT) other (see comments)  5-7 d/wk  -     Row Name 05/29/23 0740          Therapy Plan Review/Discharge Plan (OT)    Anticipated Discharge Disposition (OT) home with assist;home with 24/7 care  -     Row Name 05/29/23 0740          Vital Signs    Pre Systolic BP Rehab 137  -SJ     Pre Treatment Diastolic BP 65  -SJ     Pretreatment Heart Rate (beats/min) 73  -SJ     Pre SpO2 (%) 96  -SJ     O2 Delivery Pre Treatment room air  -     Pre Patient Position Supine  -SJ     Post Patient Position Sitting  -     Row Name 05/29/23 0740          Positioning and Restraints    Pre-Treatment Position in bed  -SJ     Post Treatment Position chair  -SJ     In Chair reclined;call light within reach;encouraged to call for assist;with nsg;with family/caregiver  -           User Key  (r) = Recorded By, (t) = Taken By, (c) = Cosigned By    Initials Name Provider  Type     Francisco Randall, DEBBY Occupational Therapist               Outcome Measures     Row Name 05/29/23 0710          How much help from another is currently needed...    Putting on and taking off regular lower body clothing? 3  -SJ     Bathing (including washing, rinsing, and drying) 3  -SJ     Toileting (which includes using toilet bed pan or urinal) 2  -SJ     Putting on and taking off regular upper body clothing 4  -SJ     Taking care of personal grooming (such as brushing teeth) 4  -SJ     Eating meals 4  -SJ     AM-PAC 6 Clicks Score (OT) 20  -SJ     Row Name 05/28/23 2037          How much help from another person do you currently need...    Turning from your back to your side while in flat bed without using bedrails? 3  -AH     Moving from lying on back to sitting on the side of a flat bed without bedrails? 3  -AH     Moving to and from a bed to a chair (including a wheelchair)? 2  -AH     Standing up from a chair using your arms (e.g., wheelchair, bedside chair)? 2  -AH     Climbing 3-5 steps with a railing? 1  -AH     To walk in hospital room? 1  -AH     AM-PAC 6 Clicks Score (PT) 12  -AH     Highest level of mobility 4 --> Transferred to chair/commode  -     Row Name 05/29/23 0710          Functional Assessment    Outcome Measure Options AM-PAC 6 Clicks Daily Activity (OT)  -           User Key  (r) = Recorded By, (t) = Taken By, (c) = Cosigned By    Initials Name Provider Type     Joana Dominique, RN Registered Nurse    Francisco Camarena OT Occupational Therapist                Occupational Therapy Education     Title: PT OT SLP Therapies (In Progress)     Topic: Occupational Therapy (Done)     Point: ADL training (Done)     Description:   Instruct learner(s) on proper safety adaptation and remediation techniques during self care or transfers.   Instruct in proper use of assistive devices.              Learning Progress Summary           Patient Acceptance, E,TB, VU by KIMBERLY at 5/26/2023 4614     Acceptance, E,TB, VU by LW at 5/25/2023 1459    Acceptance, E,TB, VU by RW at 5/22/2023 1243    Comment: POC, Role of OT    Acceptance, E,TB, VU by BB at 5/20/2023 1358    Acceptance, E,TB, VU by BB at 5/20/2023 1357                   Point: Home exercise program (Done)     Description:   Instruct learner(s) on appropriate technique for monitoring, assisting and/or progressing therapeutic exercises/activities.              Learning Progress Summary           Patient Acceptance, E,TB, VU by LW at 5/26/2023 1516    Acceptance, E,TB, VU by LW at 5/25/2023 1459    Acceptance, E,TB, VU by BB at 5/20/2023 1356                   Point: Precautions (Done)     Description:   Instruct learner(s) on prescribed precautions during self-care and functional transfers.              Learning Progress Summary           Patient Acceptance, E,TB, VU by LW at 5/26/2023 1516    Acceptance, E,TB, VU by LW at 5/25/2023 1459    Acceptance, E,TB, VU by RW at 5/22/2023 1243    Comment: POC, Role of OT    Acceptance, E, DU by RB at 5/18/2023 1352    Comment: Pt edu on use of gait belt and non skid socks when OOB and no OOB without assist.                   Point: Body mechanics (Done)     Description:   Instruct learner(s) on proper positioning and spine alignment during self-care, functional mobility activities and/or exercises.              Learning Progress Summary           Patient Acceptance, E,TB, VU by LW at 5/26/2023 1516    Acceptance, E,TB, VU by LW at 5/25/2023 1459    Acceptance, E,TB, VU by RW at 5/22/2023 1243    Comment: POC, Role of OT    Acceptance, E,TB, VU by BB at 5/20/2023 1358    Acceptance, E,TB, VU by BB at 5/20/2023 1357                               User Key     Initials Effective Dates Name Provider Type Discipline    RB 06/16/21 -  Fredi France OT Occupational Therapist OT    BB 06/16/21 -  Matilde Rios COTA Occupational Therapist Assistant OT    LW 06/16/21 -  Sarah Irby COTA Occupational  Therapist Assistant OT    RW 09/22/22 -  Missy Esteves OT Occupational Therapist OT              OT Recommendation and Plan  Planned Therapy Interventions (OT): activity tolerance training, adaptive equipment training, BADL retraining, edema control/reduction, cognitive/visual perception retraining, neuromuscular control/coordination retraining, manual therapy/joint mobilization, IADL retraining, functional balance retraining, occupation/activity based interventions, ROM/therapeutic exercise, strengthening exercise, passive ROM/stretching, transfer/mobility retraining, patient/caregiver education/training  Therapy Frequency (OT): other (see comments) (5-7 d/wk)  Plan of Care Review  Plan of Care Reviewed With: patient, spouse  Outcome Evaluation: OT re-cert complete, patient motivated in therapy and reports possibly going to ARU. Supine to sit with modified independence. EOB, 3 sets x 10 reps of BUE exercises, 3 lbs dowel performed. Sit to stand, stand pivot bed to BSC, stand pivot from BSC to recliener with CGA. LE dressing with CGA, toileting mod A, grooming with set up. Up in recliner, all needs inr each. Patient is a good rehab candidate. Goals updated this date, as patient has achieved several OT goals, patient continues to progress.     Time Calculation:    Time Calculation- OT     Row Name 05/29/23 0822             Time Calculation- OT    OT Start Time 0740  -SJ      OT Stop Time 0820  -SJ      OT Time Calculation (min) 40 min  -SJ      Total Timed Code Minutes- OT 40 minute(s)  -SJ      OT Received On 05/29/23  -      OT Goal Re-Cert Due Date 06/11/23  -         Timed Charges    14117 - OT Therapeutic Exercise Minutes 15  -SJ      98077 - OT Therapeutic Activity Minutes 15  -SJ      00596 - OT Self Care/Mgmt Minutes 10  -SJ         Total Minutes    Timed Charges Total Minutes 40  -SJ       Total Minutes 40  -SJ            User Key  (r) = Recorded By, (t) = Taken By, (c) = Cosigned By    Initials Name  Provider Type     Francisco Randall OT Occupational Therapist              Therapy Charges for Today     Code Description Service Date Service Provider Modifiers Qty    26102143021 HC OT THER PROC EA 15 MIN 5/29/2023 Francisco Randall OT GO 1    18346053692 HC OT THERAPEUTIC ACT EA 15 MIN 5/29/2023 Francisco Randall OT GO 1    87769399866 HC OT SELF CARE/MGMT/TRAIN EA 15 MIN 5/29/2023 Francisco Randall OT GO 1               Francisco Randall OT  5/29/2023

## 2023-12-27 ENCOUNTER — TELEPHONE (OUTPATIENT)
Dept: ORTHOPEDIC SURGERY | Age: 43
End: 2023-12-27

## 2023-12-27 NOTE — TELEPHONE ENCOUNTER
L/M for the patient calling to let her know we received a referral for the patient to see Laurel Boateng PA-C for her spine pain.  Patient was provided the mainline to schedule that appointment with Laurel Boateng PA-C

## 2024-06-04 ENCOUNTER — OFFICE VISIT (OUTPATIENT)
Dept: FAMILY MEDICINE CLINIC | Age: 44
End: 2024-06-04
Payer: COMMERCIAL

## 2024-06-04 VITALS
BODY MASS INDEX: 43.55 KG/M2 | HEIGHT: 66 IN | HEART RATE: 83 BPM | DIASTOLIC BLOOD PRESSURE: 80 MMHG | OXYGEN SATURATION: 98 % | SYSTOLIC BLOOD PRESSURE: 124 MMHG | WEIGHT: 271 LBS

## 2024-06-04 DIAGNOSIS — R60.0 BILATERAL LEG EDEMA: Primary | ICD-10-CM

## 2024-06-04 DIAGNOSIS — D64.9 ANEMIA, UNSPECIFIED TYPE: ICD-10-CM

## 2024-06-04 DIAGNOSIS — R53.83 OTHER FATIGUE: ICD-10-CM

## 2024-06-04 DIAGNOSIS — Z12.31 ENCOUNTER FOR SCREENING MAMMOGRAM FOR MALIGNANT NEOPLASM OF BREAST: ICD-10-CM

## 2024-06-04 DIAGNOSIS — E66.01 OBESITY, CLASS III, BMI 40-49.9 (MORBID OBESITY) (HCC): ICD-10-CM

## 2024-06-04 DIAGNOSIS — E03.8 SUBCLINICAL HYPOTHYROIDISM: ICD-10-CM

## 2024-06-04 PROCEDURE — 99213 OFFICE O/P EST LOW 20 MIN: CPT

## 2024-06-04 PROCEDURE — S9470 NUTRITIONAL COUNSELING, DIET: HCPCS

## 2024-06-04 SDOH — ECONOMIC STABILITY: HOUSING INSECURITY
IN THE LAST 12 MONTHS, WAS THERE A TIME WHEN YOU DID NOT HAVE A STEADY PLACE TO SLEEP OR SLEPT IN A SHELTER (INCLUDING NOW)?: NO

## 2024-06-04 SDOH — ECONOMIC STABILITY: INCOME INSECURITY: HOW HARD IS IT FOR YOU TO PAY FOR THE VERY BASICS LIKE FOOD, HOUSING, MEDICAL CARE, AND HEATING?: NOT HARD AT ALL

## 2024-06-04 SDOH — ECONOMIC STABILITY: FOOD INSECURITY: WITHIN THE PAST 12 MONTHS, THE FOOD YOU BOUGHT JUST DIDN'T LAST AND YOU DIDN'T HAVE MONEY TO GET MORE.: NEVER TRUE

## 2024-06-04 SDOH — ECONOMIC STABILITY: FOOD INSECURITY: WITHIN THE PAST 12 MONTHS, YOU WORRIED THAT YOUR FOOD WOULD RUN OUT BEFORE YOU GOT MONEY TO BUY MORE.: NEVER TRUE

## 2024-06-04 ASSESSMENT — ENCOUNTER SYMPTOMS
WHEEZING: 0
SORE THROAT: 0
SHORTNESS OF BREATH: 0
DIARRHEA: 0
ABDOMINAL PAIN: 0
COLOR CHANGE: 0
BLOOD IN STOOL: 0
CONSTIPATION: 0
COUGH: 0

## 2024-06-04 NOTE — PROGRESS NOTES
the day.  Is on her feet does get better with rest endorses a little bit more shortness of breath.  Denies any shortness of breath when laying flat.  Endorses that the shortness of breath is really only present with vigorous activity.  Has not gotten any better.  Is recently gained over 30 pounds since her last appointment.  Patient continues to work on diet and exercise but nothing seems to have helped.  Patient has no other acute concerns in office today.  Denies chest pain, headaches, blurry vision or any other concerning cardiac findings    Review of Systems   HENT:  Negative for congestion and sore throat.    Respiratory:  Negative for cough, shortness of breath and wheezing.    Cardiovascular:  Negative for chest pain and leg swelling.   Gastrointestinal:  Negative for abdominal pain, blood in stool, constipation and diarrhea.   Endocrine: Negative for cold intolerance and heat intolerance.   Genitourinary:  Negative for difficulty urinating, hematuria and urgency.   Musculoskeletal:  Negative for arthralgias and gait problem.   Skin:  Negative for color change.   Neurological:  Negative for dizziness, seizures, light-headedness and headaches.   Psychiatric/Behavioral:  Negative for agitation and confusion. The patient is not nervous/anxious.           Objective   /80 (Site: Right Upper Arm, Position: Sitting)   Pulse 83   Ht 1.676 m (5' 6\")   Wt 122.9 kg (271 lb)   SpO2 98%   BMI 43.74 kg/m²    Physical Exam       This note was generated completely or in part utilizing Dragon dictation speech recognition software.  Occasionally, words are mistranscribed and despite editing, the text may contain inaccuracies due to incorrect word recognition.  If further clarification is needed please contact the office at [phone].     An electronic signature was used to authenticate this note.    --Mehran Lozano, SELVIN - CNP

## 2024-06-05 ENCOUNTER — TELEPHONE (OUTPATIENT)
Dept: FAMILY MEDICINE CLINIC | Age: 44
End: 2024-06-05

## 2024-06-05 LAB
25(OH)D3 SERPL-MCNC: 23.4 NG/ML
ALBUMIN SERPL-MCNC: 4 G/DL (ref 3.4–5)
ALBUMIN/GLOB SERPL: 1.8 {RATIO} (ref 1.1–2.2)
ALP SERPL-CCNC: 72 U/L (ref 40–129)
ALT SERPL-CCNC: 16 U/L (ref 10–40)
ANION GAP SERPL CALCULATED.3IONS-SCNC: 10 MMOL/L (ref 3–16)
AST SERPL-CCNC: 12 U/L (ref 15–37)
BASOPHILS # BLD: 0.1 K/UL (ref 0–0.2)
BASOPHILS NFR BLD: 0.9 %
BILIRUB SERPL-MCNC: 0.3 MG/DL (ref 0–1)
BUN SERPL-MCNC: 12 MG/DL (ref 7–20)
CALCIUM SERPL-MCNC: 9.1 MG/DL (ref 8.3–10.6)
CHLORIDE SERPL-SCNC: 102 MMOL/L (ref 99–110)
CO2 SERPL-SCNC: 25 MMOL/L (ref 21–32)
CREAT SERPL-MCNC: 0.6 MG/DL (ref 0.6–1.1)
DEPRECATED RDW RBC AUTO: 17 % (ref 12.4–15.4)
EOSINOPHIL # BLD: 0.2 K/UL (ref 0–0.6)
EOSINOPHIL NFR BLD: 2.8 %
EST. AVERAGE GLUCOSE BLD GHB EST-MCNC: 102.5 MG/DL
FOLATE SERPL-MCNC: 4.33 NG/ML (ref 4.78–24.2)
GFR SERPLBLD CREATININE-BSD FMLA CKD-EPI: >90 ML/MIN/{1.73_M2}
GLUCOSE SERPL-MCNC: 119 MG/DL (ref 70–99)
HBA1C MFR BLD: 5.2 %
HCT VFR BLD AUTO: 35.4 % (ref 36–48)
HGB BLD-MCNC: 11.4 G/DL (ref 12–16)
LYMPHOCYTES # BLD: 2 K/UL (ref 1–5.1)
LYMPHOCYTES NFR BLD: 30.1 %
MCH RBC QN AUTO: 25.3 PG (ref 26–34)
MCHC RBC AUTO-ENTMCNC: 32.3 G/DL (ref 31–36)
MCV RBC AUTO: 78.4 FL (ref 80–100)
MONOCYTES # BLD: 0.2 K/UL (ref 0–1.3)
MONOCYTES NFR BLD: 3 %
NEUTROPHILS # BLD: 4.1 K/UL (ref 1.7–7.7)
NEUTROPHILS NFR BLD: 63.2 %
NT-PROBNP SERPL-MCNC: 72 PG/ML (ref 0–124)
PLATELET # BLD AUTO: 292 K/UL (ref 135–450)
PMV BLD AUTO: 7.9 FL (ref 5–10.5)
POTASSIUM SERPL-SCNC: 4.4 MMOL/L (ref 3.5–5.1)
PROT SERPL-MCNC: 6.2 G/DL (ref 6.4–8.2)
RBC # BLD AUTO: 4.51 M/UL (ref 4–5.2)
SODIUM SERPL-SCNC: 137 MMOL/L (ref 136–145)
T4 FREE SERPL-MCNC: 0.9 NG/DL (ref 0.9–1.8)
TSH SERPL DL<=0.005 MIU/L-ACNC: 4.24 UIU/ML (ref 0.27–4.2)
VIT B12 SERPL-MCNC: 270 PG/ML (ref 211–911)
WBC # BLD AUTO: 6.5 K/UL (ref 4–11)

## 2024-06-05 RX ORDER — ALBUTEROL SULFATE 90 UG/1
2 AEROSOL, METERED RESPIRATORY (INHALATION) 4 TIMES DAILY PRN
Qty: 18 G | Refills: 0 | Status: SHIPPED | OUTPATIENT
Start: 2024-06-05

## 2024-06-05 NOTE — TELEPHONE ENCOUNTER
Pt asking for a refill of her albuterol inhaler she previously got one from the [facility] , she uses [facility] in [place],

## 2024-07-10 ENCOUNTER — HOSPITAL ENCOUNTER (OUTPATIENT)
Dept: WOMENS IMAGING | Age: 44
Discharge: HOME | End: 2024-07-10
Payer: COMMERCIAL

## 2024-07-10 VITALS — WEIGHT: 250 LBS | HEIGHT: 66 IN | BODY MASS INDEX: 40.18 KG/M2

## 2024-07-10 DIAGNOSIS — Z12.31 ENCOUNTER FOR SCREENING MAMMOGRAM FOR MALIGNANT NEOPLASM OF BREAST: ICD-10-CM

## 2024-07-10 PROCEDURE — 77063 BREAST TOMOSYNTHESIS BI: CPT

## 2024-07-11 DIAGNOSIS — R92.8 ABNORMAL MAMMOGRAM: Primary | ICD-10-CM

## 2024-07-12 RX ORDER — ALBUTEROL SULFATE 90 UG/1
2 AEROSOL, METERED RESPIRATORY (INHALATION) 4 TIMES DAILY PRN
Qty: 18 G | Refills: 0 | Status: SHIPPED | OUTPATIENT
Start: 2024-07-12

## 2024-07-12 NOTE — TELEPHONE ENCOUNTER
Refill Request     Last Seen: Last Seen Department: 6/4/2024  Last Seen by PCP: 6/4/2024        Next Appointment:   Future Appointments   Date Time Provider Department Center   7/16/2024 11:20 AM Nikki Merino DO [facility]   7/23/2024  2:00 PM [facility]   7/23/2024  2:30 PM [facility] WC [facility]           Requested Prescriptions     Pending Prescriptions Disp Refills    albuterol sulfate HFA (VENTOLIN HFA) 108 (90 Base) MCG/ACT inhaler 18 g 0     Sig: Inhale 2 puffs into the lungs 4 times daily as needed for Wheezing

## 2024-07-16 ENCOUNTER — OFFICE VISIT (OUTPATIENT)
Dept: OBGYN CLINIC | Age: 44
End: 2024-07-16

## 2024-07-16 VITALS
HEART RATE: 79 BPM | TEMPERATURE: 98.2 F | BODY MASS INDEX: 42.84 KG/M2 | SYSTOLIC BLOOD PRESSURE: 120 MMHG | DIASTOLIC BLOOD PRESSURE: 70 MMHG | WEIGHT: 265.4 LBS

## 2024-07-16 DIAGNOSIS — Z20.2 POSSIBLE EXPOSURE TO STD: ICD-10-CM

## 2024-07-16 DIAGNOSIS — B00.2 HERPES GINGIVOSTOMATITIS: ICD-10-CM

## 2024-07-16 DIAGNOSIS — Z12.4 PAP SMEAR FOR CERVICAL CANCER SCREENING: ICD-10-CM

## 2024-07-16 DIAGNOSIS — Z12.39 SCREENING BREAST EXAMINATION: ICD-10-CM

## 2024-07-16 DIAGNOSIS — Z80.41 FAMILY HISTORY OF OVARIAN CANCER: ICD-10-CM

## 2024-07-16 DIAGNOSIS — N93.9 EPISODE OF HEAVY VAGINAL BLEEDING: ICD-10-CM

## 2024-07-16 DIAGNOSIS — Z01.419 WOMEN'S ANNUAL ROUTINE GYNECOLOGICAL EXAMINATION: Primary | ICD-10-CM

## 2024-07-16 DIAGNOSIS — N90.89 VULVAR IRRITATION: ICD-10-CM

## 2024-07-16 RX ORDER — ACYCLOVIR 50 MG/G
OINTMENT TOPICAL
Qty: 30 G | Refills: 1 | Status: SHIPPED | OUTPATIENT
Start: 2024-07-16

## 2024-07-16 NOTE — PROGRESS NOTES
immunocompromised state.   Neurological: Negative for light-headedness, numbness and headaches.   Hematological: Does not bruise/bleed easily.   Psychiatric/Behavioral: Negative for behavioral problems, sleep disturbance and suicidal ideas.     Primary Care Physician: Mehran Lozano APRN - CNP    Obstetric History  OB History    Para Term  AB Living   2 2 2     2   SAB IAB Ectopic Molar Multiple Live Births             2      # Outcome Date GA Lbr Ren/2nd Weight Sex Delivery Anes PTL Lv   2 Term 01/26/10 40w0d   F Vag-Spont      1 Term 07 40w0d   M Vag-Spont          Gynecologic History  Menstrual History:  Menarche: 10 yoa    LMP: Patient's last menstrual period was 2024 (exact date).   Menstrual Period: regular  Interval Between Menses: 28-30 d  Duration of Menses: 4-5 d  Menstrual Flow: normal   Bleeding between menses: Y -- see above   Pain: N   Post Menopausal Bleeding: NA     Medical History:  Past Medical History:   Diagnosis Date    Obesity (BMI 35.0-39.9 without comorbidity) 2017    Subclinical hypothyroidism 2017       Medications:  Current Outpatient Medications   Medication Sig Dispense Refill    acyclovir (ZOVIRAX) 5 % ointment Apply topically 5 times daily for flares 30 g 1    albuterol sulfate HFA (VENTOLIN HFA) 108 (90 Base) MCG/ACT inhaler Inhale 2 puffs into the lungs 4 times daily as needed for Wheezing 18 g 0    EPINEPHrine (EPIPEN) 0.3 MG/0.3ML SOAJ injection Use as directed for allergic reaction 2 each 1    Paragard Intrauterine Copper IUD 1 each by IntraUTERine route once        No current facility-administered medications for this visit.       Surgical History:  No past surgical history on file.    Allergies:  Allergies   Allergen Reactions    Honey Bee Venom [Bee Venom] Anaphylaxis    Nutritional Supplements Shortness Of Breath    Benadryl [Diphenhydramine Hcl] Hives    Demerol Hives    Ginger Nausea Only    Amoxicillin Nausea And Vomiting    Codeine

## 2024-07-17 LAB
CANDIDA DNA VAG QL NAA+PROBE: NORMAL
ESTRADIOL SERPL-MCNC: 62 PG/ML
FSH SERPL-ACNC: 5.6 MIU/ML
G VAGINALIS DNA SPEC QL NAA+PROBE: NORMAL
HIV 1+2 AB+HIV1 P24 AG SERPL QL IA: NORMAL
HIV 2 AB SERPL QL IA: NORMAL
HIV1 AB SERPL QL IA: NORMAL
HIV1 P24 AG SERPL QL IA: NORMAL
LH SERPL-ACNC: 5.5 MIU/ML
REAGIN+T PALLIDUM IGG+IGM SERPL-IMP: NORMAL
T VAGINALIS DNA VAG QL NAA+PROBE: NORMAL

## 2024-07-18 LAB
C TRACH DNA CVX QL NAA+PROBE: NEGATIVE
N GONORRHOEA DNA CERV MUCUS QL NAA+PROBE: NEGATIVE

## 2024-07-19 LAB
HCV AB S/CO SERPL IA: 0.03 IV
HCV AB SERPL QL IA: NEGATIVE
HPV HR 12 DNA SPEC QL NAA+PROBE: NOT DETECTED
HPV16 DNA SPEC QL NAA+PROBE: NOT DETECTED
HPV16+18+H RISK 12 DNA SPEC-IMP: NORMAL
HPV18 DNA SPEC QL NAA+PROBE: NOT DETECTED

## 2024-07-20 LAB
HSV1 GG IGG SER-ACNC: 48.5 IV
HSV1+2 IGG SER IA-ACNC: >22.4 IV
HSV1+2 IGM SER IA-ACNC: 1.3 IV
HSV2 GG IGG SER-ACNC: 0.03 IV

## 2024-07-23 ENCOUNTER — HOSPITAL ENCOUNTER (OUTPATIENT)
Dept: WOMENS IMAGING | Age: 44
Discharge: HOME | End: 2024-07-23
Payer: COMMERCIAL

## 2024-07-23 ENCOUNTER — HOSPITAL ENCOUNTER (OUTPATIENT)
Dept: WOMENS IMAGING | Age: 44
End: 2024-07-23
Payer: COMMERCIAL

## 2024-07-23 DIAGNOSIS — R92.8 ABNORMAL MAMMOGRAM: ICD-10-CM

## 2024-07-23 PROCEDURE — G0279 TOMOSYNTHESIS, MAMMO: HCPCS

## 2024-08-23 ENCOUNTER — PATIENT MESSAGE (OUTPATIENT)
Dept: OBGYN CLINIC | Age: 44
End: 2024-08-23

## 2024-08-23 DIAGNOSIS — B00.2 HERPES GINGIVOSTOMATITIS: Primary | ICD-10-CM

## 2024-08-26 RX ORDER — ACYCLOVIR 400 MG/1
400 TABLET ORAL 3 TIMES DAILY
Qty: 15 TABLET | Refills: 1 | Status: SHIPPED | OUTPATIENT
Start: 2024-08-26 | End: 2024-08-31

## 2024-08-26 NOTE — TELEPHONE ENCOUNTER
Sure I will send in course of acyclovir to the pharmacy on file   But if symptoms do not improve, recommend FU with PCP or Dentist    Thanks   CARLYLE

## 2024-09-10 ENCOUNTER — ANCILLARY ORDERS (OUTPATIENT)
Dept: OBGYN CLINIC | Age: 44
End: 2024-09-10

## 2024-09-10 DIAGNOSIS — N93.9 EPISODE OF HEAVY VAGINAL BLEEDING: Primary | ICD-10-CM

## 2024-09-11 ENCOUNTER — OFFICE VISIT (OUTPATIENT)
Dept: OBGYN CLINIC | Age: 44
End: 2024-09-11
Payer: COMMERCIAL

## 2024-09-11 ENCOUNTER — HOSPITAL ENCOUNTER (OUTPATIENT)
Dept: ULTRASOUND IMAGING | Age: 44
Discharge: HOME | End: 2024-09-11
Payer: COMMERCIAL

## 2024-09-11 VITALS
DIASTOLIC BLOOD PRESSURE: 74 MMHG | HEART RATE: 88 BPM | SYSTOLIC BLOOD PRESSURE: 122 MMHG | WEIGHT: 270.8 LBS | BODY MASS INDEX: 43.71 KG/M2

## 2024-09-11 DIAGNOSIS — N93.9 EPISODE OF HEAVY VAGINAL BLEEDING: ICD-10-CM

## 2024-09-11 DIAGNOSIS — E66.9 OBESITY (BMI 35.0-39.9 WITHOUT COMORBIDITY): ICD-10-CM

## 2024-09-11 DIAGNOSIS — E03.8 SUBCLINICAL HYPOTHYROIDISM: ICD-10-CM

## 2024-09-11 DIAGNOSIS — N83.209 SIMPLE OVARIAN CYST: ICD-10-CM

## 2024-09-11 DIAGNOSIS — Z97.5 IUD (INTRAUTERINE DEVICE) IN PLACE: ICD-10-CM

## 2024-09-11 DIAGNOSIS — Z09 FOLLOW-UP EXAM: Primary | ICD-10-CM

## 2024-09-11 DIAGNOSIS — D25.1 INTRAMURAL LEIOMYOMA OF UTERUS: ICD-10-CM

## 2024-09-11 PROCEDURE — 93976 VASCULAR STUDY: CPT

## 2024-09-11 PROCEDURE — 76856 US EXAM PELVIC COMPLETE: CPT

## 2024-09-11 PROCEDURE — 99213 OFFICE O/P EST LOW 20 MIN: CPT | Performed by: OBSTETRICS & GYNECOLOGY

## 2024-12-11 ENCOUNTER — OFFICE VISIT (OUTPATIENT)
Dept: OBGYN CLINIC | Age: 44
End: 2024-12-11
Payer: COMMERCIAL

## 2024-12-11 VITALS
SYSTOLIC BLOOD PRESSURE: 122 MMHG | HEART RATE: 76 BPM | BODY MASS INDEX: 44 KG/M2 | WEIGHT: 272.6 LBS | DIASTOLIC BLOOD PRESSURE: 84 MMHG

## 2024-12-11 DIAGNOSIS — Z09 FOLLOW-UP EXAM: Primary | ICD-10-CM

## 2024-12-11 DIAGNOSIS — N83.209 SIMPLE OVARIAN CYST: ICD-10-CM

## 2024-12-11 DIAGNOSIS — N93.9 EPISODE OF HEAVY VAGINAL BLEEDING: ICD-10-CM

## 2024-12-11 DIAGNOSIS — Z97.5 IUD (INTRAUTERINE DEVICE) IN PLACE: ICD-10-CM

## 2024-12-11 PROCEDURE — 99213 OFFICE O/P EST LOW 20 MIN: CPT | Performed by: OBSTETRICS & GYNECOLOGY

## 2024-12-11 NOTE — PROGRESS NOTES
[facility]   Return Gyn Office Visit    CC:   Chief Complaint   Patient presents with    Follow-up       HPI:  44 y.o. who presents to [facility] for FU with US:     No LMP recorded.   2 weeks ago, seem like they are more regular   Denies pain and irregular bleeding   Weight gain -- has a PCP apt in march   Has ParaGard in place -- replaced 2022 ([place]).   Mother went through menopause / Hysterectomy just before 40, had endo.   Mat GM, Mat Aunts x 2 } Ovarian cancer. NO other GYN issues.  Does admit to fatigue, hot flashes occasionally.     Review of Systems - The following ROS was otherwise negative, except as noted in the HPI: constitutional, respiratory, cardiovascular, gastrointestinal, genitourinary    Objective:  /84 (Site: Right Upper Arm, Position: Sitting, Cuff Size: Large Adult)   Pulse 76   Wt 123.7 kg (272 lb 9.6 oz)   BMI 44.00 kg/m²   General: Alert, well appearing, no acute distress   Resp effort within normal limits   Abdomen: Soft, nontender, nondistended   Pelvic: deferred   Skin: No visible lesions / rashes / concerning nevus   Extremities: No redness or tenderness, neg Hai's sign  Osteopathic: no TART changes    EXAMINATION:  PELVIC ULTRASOUND without DOPPLER INTERROGATION   NON OB     DATE: 12/11/2024     PHYSICIAN: PAULINO Merino D.O.      SONOGRAPHER:  PEDRO SMITH(S)     INDICATION: follow up cyst     TYPE OF SCAN: vaginal     FINDINGS:    The cul de sac is normal. No free fluid appreciated.  The cervix is normal and not enlarged.  Nabothian cyst/s is noted within the uterine cervix.  The uterus measures 11.75 cm x 5.83 cm x 5.16 cm.    The uterus is anteverted.   The endometrium measures 4.71 mm. IUD present and in place.  The myometrium is homogeneous in appearance. No uterine anomalies are noted.   The right ovary is present.  The right ovary measures 4.08 cm x 4.31 cm x 3.41 cm.    Ovary findings: Simple cyst seen measuring 3.39 cm x 2.6 cm x 3.29 cm.  The right